# Patient Record
Sex: FEMALE | Race: WHITE | Employment: UNEMPLOYED | URBAN - METROPOLITAN AREA
[De-identification: names, ages, dates, MRNs, and addresses within clinical notes are randomized per-mention and may not be internally consistent; named-entity substitution may affect disease eponyms.]

---

## 2017-01-26 ENCOUNTER — HOSPITAL ENCOUNTER (EMERGENCY)
Age: 32
Discharge: LEFT BEFORE DISCHARGE | End: 2017-01-26
Attending: EMERGENCY MEDICINE
Payer: COMMERCIAL

## 2017-01-26 VITALS
HEIGHT: 66 IN | HEART RATE: 77 BPM | DIASTOLIC BLOOD PRESSURE: 94 MMHG | OXYGEN SATURATION: 100 % | BODY MASS INDEX: 47.09 KG/M2 | SYSTOLIC BLOOD PRESSURE: 141 MMHG | TEMPERATURE: 98.1 F | RESPIRATION RATE: 18 BRPM | WEIGHT: 293 LBS

## 2017-01-26 PROCEDURE — 75810000275 HC EMERGENCY DEPT VISIT NO LEVEL OF CARE

## 2017-08-21 ENCOUNTER — HOSPITAL ENCOUNTER (EMERGENCY)
Age: 32
Discharge: HOME | End: 2017-08-21
Attending: EMERGENCY MEDICINE
Payer: COMMERCIAL

## 2017-08-21 VITALS
WEIGHT: 293 LBS | HEART RATE: 92 BPM | BODY MASS INDEX: 48.82 KG/M2 | OXYGEN SATURATION: 98 % | HEIGHT: 65 IN | SYSTOLIC BLOOD PRESSURE: 127 MMHG | DIASTOLIC BLOOD PRESSURE: 79 MMHG | TEMPERATURE: 98.2 F | RESPIRATION RATE: 12 BRPM

## 2017-08-21 DIAGNOSIS — N30.00 ACUTE CYSTITIS WITHOUT HEMATURIA: ICD-10-CM

## 2017-08-21 DIAGNOSIS — S30.0XXA LUMBAR CONTUSION, INITIAL ENCOUNTER: Primary | ICD-10-CM

## 2017-08-21 LAB
APPEARANCE UR: CLEAR
BACTERIA URNS QL MICRO: ABNORMAL /HPF
BILIRUB UR QL: NEGATIVE
COLOR UR: ABNORMAL
EPITH CASTS URNS QL MICRO: ABNORMAL /LPF
GLUCOSE UR STRIP.AUTO-MCNC: NEGATIVE MG/DL
HCG UR QL: NEGATIVE
HGB UR QL STRIP: NEGATIVE
KETONES UR QL STRIP.AUTO: NEGATIVE MG/DL
LEUKOCYTE ESTERASE UR QL STRIP.AUTO: ABNORMAL
NITRITE UR QL STRIP.AUTO: NEGATIVE
PH UR STRIP: 6.5 [PH] (ref 5–8)
PROT UR STRIP-MCNC: NEGATIVE MG/DL
RBC #/AREA URNS HPF: ABNORMAL /HPF (ref 0–5)
SP GR UR REFRACTOMETRY: 1.02 (ref 1–1.03)
UA: UC IF INDICATED,UAUC: ABNORMAL
UROBILINOGEN UR QL STRIP.AUTO: 0.2 EU/DL (ref 0.2–1)
WBC URNS QL MICRO: ABNORMAL /HPF (ref 0–4)

## 2017-08-21 PROCEDURE — 87086 URINE CULTURE/COLONY COUNT: CPT | Performed by: EMERGENCY MEDICINE

## 2017-08-21 PROCEDURE — 81001 URINALYSIS AUTO W/SCOPE: CPT | Performed by: EMERGENCY MEDICINE

## 2017-08-21 PROCEDURE — 81025 URINE PREGNANCY TEST: CPT

## 2017-08-21 PROCEDURE — 74011250637 HC RX REV CODE- 250/637: Performed by: EMERGENCY MEDICINE

## 2017-08-21 PROCEDURE — 99283 EMERGENCY DEPT VISIT LOW MDM: CPT

## 2017-08-21 RX ORDER — IBUPROFEN 800 MG/1
800 TABLET ORAL
Qty: 20 TAB | Refills: 0 | Status: SHIPPED | OUTPATIENT
Start: 2017-08-21 | End: 2017-08-28

## 2017-08-21 RX ORDER — CIPROFLOXACIN 500 MG/1
500 TABLET ORAL 2 TIMES DAILY
Qty: 10 TAB | Refills: 0 | Status: SHIPPED | OUTPATIENT
Start: 2017-08-21 | End: 2017-08-26

## 2017-08-21 RX ORDER — HYDROCODONE BITARTRATE AND ACETAMINOPHEN 10; 325 MG/1; MG/1
1 TABLET ORAL
Qty: 20 TAB | Refills: 0 | Status: SHIPPED | OUTPATIENT
Start: 2017-08-21 | End: 2020-01-01

## 2017-08-21 RX ORDER — HYDROCODONE BITARTRATE AND ACETAMINOPHEN 5; 325 MG/1; MG/1
1 TABLET ORAL
Status: COMPLETED | OUTPATIENT
Start: 2017-08-21 | End: 2017-08-21

## 2017-08-21 RX ORDER — METHOCARBAMOL 500 MG/1
500 TABLET, FILM COATED ORAL 4 TIMES DAILY
Qty: 30 TAB | Refills: 0 | Status: SHIPPED | OUTPATIENT
Start: 2017-08-21 | End: 2020-01-01

## 2017-08-21 RX ADMIN — HYDROCODONE BITARTRATE AND ACETAMINOPHEN 1 TABLET: 5; 325 TABLET ORAL at 19:47

## 2017-08-21 NOTE — ED NOTES
Patient presented to the ED today for complaints of medial lower back pain. Patient says symptoms started three days ago. Patient rates pain 9/10 and pressure sensation. Respirations are even and unalbreod. Skin is dry,warm, and itnact.

## 2017-08-21 NOTE — DISCHARGE INSTRUCTIONS
Low Back Contusion: Care Instructions  Your Care Instructions  Contusion is the medical term for a bruise. When you have a low back bruise, it's often caused by a direct blow or an impact, such as falling against a counter or table. Bruises are common sports injuries. Most people think of a bruise as a black-and-blue spot. This happens when small blood vessels get torn and leak blood under the skin. But bones, muscles, and organs can also get bruised. If these deep tissues are damaged, you may not always see a bruise. The doctor will examine your bruise. You may also have tests to make sure you do not have a more serious injury, such as a broken bone or nerve damage. Tests may include X-rays or other imaging tests like a CT scan or MRI. Low back bruises may cause pain and swelling. But if there is no serious damage, they will often get better with home treatment in several days to a few weeks. The doctor has checked you carefully, but problems can develop later. If you notice any problems or new symptoms, get medical treatment right away. Follow-up care is a key part of your treatment and safety. Be sure to make and go to all appointments, and call your doctor if you are having problems. It's also a good idea to know your test results and keep a list of the medicines you take. How can you care for yourself at home? · Put ice or a cold pack on the sore area for 10 to 20 minutes at a time to stop swelling. Put a thin cloth between the ice pack and your skin. · Be safe with medicines. Read and follow all instructions on the label. ¨ If the doctor gave you a prescription medicine for pain, take it as prescribed. ¨ If you are not taking a prescription pain medicine, ask your doctor if you can take an over-the-counter medicine. · For the first day or two of pain, take it easy. But as soon as possible, get back to your normal daily life and activities. · Get gentle exercise, such as walking.  Movement keeps your spine flexible and helps your muscles stay strong. When should you call for help? Call 911 anytime you think you may need emergency care. For example, call if:  · You are unable to move a leg at all. Call your doctor now or seek immediate medical care if:  · You have new or worse symptoms in your legs or buttocks. Symptoms may include:  ¨ Numbness or tingling. ¨ Weakness. ¨ Pain. · You lose bladder or bowel control. · You have blood in your urine. Watch closely for changes in your health, and be sure to contact your doctor if:  · You do not get better as expected. Where can you learn more? Go to http://tita-wade.info/. Enter V337 in the search box to learn more about \"Low Back Contusion: Care Instructions. \"  Current as of: March 20, 2017  Content Version: 11.3  © 1655-4515 [email or web address]. Care instructions adapted under license by [employer] (which disclaims liability or warranty for this information). If you have questions about a medical condition or this instruction, always ask your healthcare professional. [place] [address] any warranty or liability for your use of this information. Urinary Tract Infection in Women: Care Instructions  Your Care Instructions    A urinary tract infection, or UTI, is a general term for an infection anywhere between the kidneys and the urethra (where urine comes out). Most UTIs are bladder infections. They often cause pain or burning when you urinate. UTIs are caused by bacteria and can be cured with antibiotics. Be sure to complete your treatment so that the infection goes away. Follow-up care is a key part of your treatment and safety. Be sure to make and go to all appointments, and call your doctor if you are having problems. It's also a good idea to know your test results and keep a list of the medicines you take. How can you care for yourself at home? · Take your antibiotics as directed.  Do not stop taking them just because you feel better. You need to take the full course of antibiotics. · Drink extra water and other fluids for the next day or two. This may help wash out the bacteria that are causing the infection. (If you have kidney, heart, or liver disease and have to limit fluids, talk with your doctor before you increase your fluid intake.)  · Avoid drinks that are carbonated or have caffeine. They can irritate the bladder. · Urinate often. Try to empty your bladder each time. · To relieve pain, take a hot bath or lay a heating pad set on low over your lower belly or genital area. Never go to sleep with a heating pad in place. To prevent UTIs  · Drink plenty of water each day. This helps you urinate often, which clears bacteria from your system. (If you have kidney, heart, or liver disease and have to limit fluids, talk with your doctor before you increase your fluid intake.)  · Urinate when you need to. · Urinate right after you have sex. · Change sanitary pads often. · Avoid douches, bubble baths, feminine hygiene sprays, and other feminine hygiene products that have deodorants. · After going to the bathroom, wipe from front to back. When should you call for help? Call your doctor now or seek immediate medical care if:  · Symptoms such as fever, chills, nausea, or vomiting get worse or appear for the first time. · You have new pain in your back just below your rib cage. This is called flank pain. · There is new blood or pus in your urine. · You have any problems with your antibiotic medicine. Watch closely for changes in your health, and be sure to contact your doctor if:  · You are not getting better after taking an antibiotic for 2 days. · Your symptoms go away but then come back. Where can you learn more? Go to http://tita-wade.info/. Enter Z598 in the search box to learn more about \"Urinary Tract Infection in Women: Care Instructions. \"  Current as of: November 28, 2016  Content Version: 11.3  © 3019-2833 [employer]. Care instructions adapted under license by [employer] (which disclaims liability or warranty for this information). If you have questions about a medical condition or this instruction, always ask your healthcare professional. [address] any warranty or liability for your use of this information.

## 2017-08-21 NOTE — LETTER
[facility] EMERGENCY DEPT 
[address] [address] [phone] Work/School Note Date: 8/21/2017 To Whom It May concern: Nora Zapata was seen and treated today in the emergency room by the following provider(s): 
Attending Provider: Kerrie Perales MD.   
 
Nora Zapata may return to work on August 26th 2017. Sincerely, Trini Faye MD

## 2017-08-21 NOTE — ED PROVIDER NOTES
HPI Comments: [address], 29 y.o. female, with PMHx of depression, back pain presents ambulatory to [facility] ED with cc of \"tight, pressure\" lower back pain x 3 days. Pt states that she slipped and fell in her bathroom 1 week ago, but did not begin experiencing back pain until the past few days. She denies head trauma or LOC. She denies any numbness in her legs. Pt notes that she has a hx of back pain but has not received a specific diagnosis from her doctor. LMP was 3 years ago (patient is on birth control). NKDA. PCP: Savita Sanders NP    Social history significant for: - Tobacco, + EtOH, - Illicit Drug Use    There are no other complaints, changes, or physical findings at this time. The history is provided by the patient. No  was used. Past Medical History:   Diagnosis Date    Depression        Past Surgical History:   Procedure Laterality Date    HX DILATION AND CURETTAGE      HX HEENT      tonsillectomy    HX TONSILLECTOMY           Family History:   Problem Relation Age of Onset    Thyroid Disease Mother     Asthma Father     No Known Problems Sister     No Known Problems Brother        Social History     Social History    Marital status: SINGLE     Spouse name: N/A    Number of children: N/A    Years of education: N/A     Occupational History    Not on file. Social History Main Topics    Smoking status: Never Smoker    Smokeless tobacco: Never Used      Comment: smoked a few months at age 25mths    Alcohol use Yes      Comment: Social    Drug use: No    Sexual activity: Yes     Partners: Male     Birth control/ protection: Pill     Other Topics Concern    Not on file     Social History Narrative         ALLERGIES: Review of patient's allergies indicates no known allergies. Review of Systems   Constitutional: Negative for fever. HENT: Negative for sore throat. Eyes: Negative for photophobia and redness.    Respiratory: Negative for shortness of breath and wheezing. Cardiovascular: Negative for chest pain and leg swelling. Gastrointestinal: Negative for abdominal pain, blood in stool, nausea and vomiting. Genitourinary: Negative for difficulty urinating, dysuria, hematuria, menstrual problem and vaginal bleeding. Musculoskeletal: Positive for back pain. Negative for joint swelling. Neurological: Negative for dizziness, seizures, syncope, speech difficulty, weakness, numbness and headaches. Hematological: Negative for adenopathy. Psychiatric/Behavioral: Negative for agitation, confusion and suicidal ideas. The patient is not nervous/anxious. Patient Vitals for the past 12 hrs:   Temp Pulse Resp BP SpO2   08/21/17 1903 98.2 °F (36.8 °C) 92 12 127/79 98 %     Physical Exam   Constitutional: She is oriented to person, place, and time. She appears well-developed and well-nourished. No distress. HENT:   Head: Normocephalic and atraumatic. Mouth/Throat: Oropharynx is clear and moist. No oropharyngeal exudate. Eyes: Conjunctivae and EOM are normal. Pupils are equal, round, and reactive to light. Left eye exhibits no discharge. Neck: Normal range of motion. Neck supple. No JVD present. Cardiovascular: Normal rate, regular rhythm, normal heart sounds and intact distal pulses. Pulmonary/Chest: Effort normal and breath sounds normal. No respiratory distress. She has no wheezes. Abdominal: Soft. Bowel sounds are normal. She exhibits no distension. There is no tenderness. There is no rebound and no guarding. Musculoskeletal: Normal range of motion. She exhibits no edema or deformity. + Lumbosacral area tenderness   Lymphadenopathy:     She has no cervical adenopathy. Neurological: She is alert and oriented to person, place, and time. She has normal reflexes. No cranial nerve deficit. Skin: Skin is warm and dry. No rash noted. Psychiatric: She has a normal mood and affect.  Her behavior is normal.   Nursing note and vitals reviewed. MDM  Number of Diagnoses or Management Options  Diagnosis management comments: DDx: lumbosacral contusion, lumbosacral strain, lumbosacral radiculopathy, UTI       Amount and/or Complexity of Data Reviewed  Clinical lab tests: ordered and reviewed  Review and summarize past medical records: yes  Independent visualization of images, tracings, or specimens: yes    Patient Progress  Patient progress: stable    ED Course       Procedures      LABORATORY TESTS:  Recent Results (from the past 12 hour(s))   HCG URINE, QL. - POC    Collection Time: 08/21/17  7:19 PM   Result Value Ref Range    Pregnancy test,urine (POC) NEGATIVE  NEG     URINALYSIS W/ REFLEX CULTURE    Collection Time: 08/21/17  7:20 PM   Result Value Ref Range    Color YELLOW/STRAW      Appearance CLEAR CLEAR      Specific gravity 1.025 1.003 - 1.030      pH (UA) 6.5 5.0 - 8.0      Protein NEGATIVE  NEG mg/dL    Glucose NEGATIVE  NEG mg/dL    Ketone NEGATIVE  NEG mg/dL    Bilirubin NEGATIVE  NEG      Blood NEGATIVE  NEG      Urobilinogen 0.2 0.2 - 1.0 EU/dL    Nitrites NEGATIVE  NEG      Leukocyte Esterase MODERATE (A) NEG      WBC PENDING /hpf    RBC PENDING /hpf    Epithelial cells PENDING /lpf    Bacteria PENDING /hpf    UA:UC IF INDICATED PENDING        MEDICATIONS GIVEN:  Medications   HYDROcodone-acetaminophen (NORCO) 5-325 mg per tablet 1 Tab (not administered)       IMPRESSION:  1. Lumbar contusion, initial encounter    2. Acute cystitis without hematuria        PLAN:  1. Current Discharge Medication List      START taking these medications    Details   ciprofloxacin HCl (CIPRO) 500 mg tablet Take 1 Tab by mouth two (2) times a day for 5 days. Qty: 10 Tab, Refills: 0      HYDROcodone-acetaminophen (NORCO)  mg tablet Take 1 Tab by mouth every six (6) hours as needed for Pain. Max Daily Amount: 4 Tabs.   Qty: 20 Tab, Refills: 0      ibuprofen (MOTRIN) 800 mg tablet Take 1 Tab by mouth every six (6) hours as needed for Pain for up to 7 days. Qty: 20 Tab, Refills: 0      methocarbamol (ROBAXIN) 500 mg tablet Take 1 Tab by mouth four (4) times daily. Qty: 30 Tab, Refills: 0           2. Follow-up Information     Follow up With Details Comments Jovani Max NP In 1 week  [address] [address]   Krunal Leonard  [phone]          Return to ED if worse     Discharge Note:  7:43 PM    The pt is ready for discharge. The pt's signs, symptoms, diagnosis, and discharge instructions have been discussed and pt has conveyed their understanding. The pt is to follow up as recommended or return to ER should their symptoms worsen. Plan has been discussed and pt is in agreement. This note is prepared by Lisha Bazan, acting as a Scribe for Clemencia Felix MD.    Clemencia Felix MD: The scribe's documentation has been prepared under my direction and personally reviewed by me in its entirety. I confirm that the notes above accurately reflects all work, treatment, procedures, and medical decision making performed by me.

## 2017-08-21 NOTE — ED NOTES
Patient educated on discharge instructions and four (three electronic and one paper)  prescriptions . Patient verbalized understanding of eduction. Patient given discharge instructions and four prescriptions (three electronic and one paper). Patient ambulated out of ED . No acute distress noted. Emergency Department Nursing Plan of Care       The Nursing Plan of Care is developed from the Nursing assessment and Emergency Department Attending provider initial evaluation. The plan of care may be reviewed in the ED Provider note.     The Plan of Care was developed with the following considerations:   Patient / Family readiness to learn indicated by:verbalized understanding  Persons(s) to be included in education: patient  Barriers to Learning/Limitations:No    Signed     Ailyn May RN    8/21/2017   7:53 PM

## 2017-08-23 LAB
BACTERIA SPEC CULT: NORMAL
CC UR VC: NORMAL
SERVICE CMNT-IMP: NORMAL

## 2020-01-01 ENCOUNTER — HOSPITAL ENCOUNTER (EMERGENCY)
Age: 35
Discharge: HOME | End: 2020-01-01
Attending: EMERGENCY MEDICINE
Payer: COMMERCIAL

## 2020-01-01 VITALS
TEMPERATURE: 98.2 F | HEIGHT: 66 IN | HEART RATE: 87 BPM | RESPIRATION RATE: 26 BRPM | DIASTOLIC BLOOD PRESSURE: 95 MMHG | SYSTOLIC BLOOD PRESSURE: 109 MMHG | BODY MASS INDEX: 47.09 KG/M2 | WEIGHT: 293 LBS | OXYGEN SATURATION: 100 %

## 2020-01-01 DIAGNOSIS — H66.002 ACUTE SUPPURATIVE OTITIS MEDIA OF LEFT EAR WITHOUT SPONTANEOUS RUPTURE OF TYMPANIC MEMBRANE, RECURRENCE NOT SPECIFIED: Primary | ICD-10-CM

## 2020-01-01 PROCEDURE — 99282 EMERGENCY DEPT VISIT SF MDM: CPT

## 2020-01-01 RX ORDER — IBUPROFEN 800 MG/1
800 TABLET ORAL
Qty: 20 TAB | Refills: 0 | Status: SHIPPED | OUTPATIENT
Start: 2020-01-01 | End: 2020-01-01

## 2020-01-01 RX ORDER — PROPRANOLOL HYDROCHLORIDE 20 MG/1
20 TABLET ORAL DAILY
Status: ON HOLD | COMMUNITY
End: 2021-04-12

## 2020-01-01 RX ORDER — AMOXICILLIN 500 MG/1
500 TABLET, FILM COATED ORAL 3 TIMES DAILY
Qty: 30 TAB | Refills: 0 | Status: SHIPPED | OUTPATIENT
Start: 2020-01-01 | End: 2020-01-01

## 2020-01-01 RX ORDER — AMOXICILLIN 500 MG/1
500 TABLET, FILM COATED ORAL 3 TIMES DAILY
Qty: 30 TAB | Refills: 0 | Status: ON HOLD | OUTPATIENT
Start: 2020-01-01 | End: 2021-04-12

## 2020-01-01 RX ORDER — IBUPROFEN 800 MG/1
800 TABLET ORAL
Qty: 20 TAB | Refills: 0 | Status: SHIPPED | OUTPATIENT
Start: 2020-01-01 | End: 2020-01-08

## 2020-01-01 RX ORDER — CETIRIZINE HYDROCHLORIDE, PSEUDOEPHEDRINE HYDROCHLORIDE 5; 120 MG/1; MG/1
1 TABLET, FILM COATED, EXTENDED RELEASE ORAL 2 TIMES DAILY
Qty: 20 TAB | Refills: 0 | Status: SHIPPED | OUTPATIENT
Start: 2020-01-01 | End: 2020-01-01

## 2020-01-01 RX ORDER — CETIRIZINE HYDROCHLORIDE, PSEUDOEPHEDRINE HYDROCHLORIDE 5; 120 MG/1; MG/1
1 TABLET, FILM COATED, EXTENDED RELEASE ORAL 2 TIMES DAILY
Qty: 20 TAB | Refills: 0 | Status: ON HOLD | OUTPATIENT
Start: 2020-01-01 | End: 2021-04-12

## 2020-01-01 NOTE — ED PROVIDER NOTES
EMERGENCY DEPARTMENT HISTORY AND PHYSICAL EXAM      Date: 1/1/2020  Patient Name: Malka Jefferson    History of Presenting Illness     Chief Complaint   Patient presents with    Ear Pain     pt states, \"I think I have a sinus infection. \" reports left ear pain and shortness of breath since Thursday    Shortness of Breath       History Provided By: Patient    HPI: Malka Jefferson, 30 y.o. female presents ambulatory to the ED with cc of almost 1 week of 7 out of 10 constant, aching left ear pain that is not any better with ibuprofen and not associated with fever. She also complains of some sinus pressure and chest congestion with a slight cough. She denies any history of asthma. 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 Though she endorses the chest congestion, she denies chest pain or shortness of breath, per se. There are no other complaints, changes, or physical findings at this time. PCP: Deshaun Knox NP    Current Outpatient Medications   Medication Sig Dispense Refill    propranolol (INDERAL) 20 mg tablet Take 20 mg by mouth daily. Indications: anxiety      amoxicillin 500 mg tab Take 500 mg by mouth three (3) times daily. 30 Tab 0    cetirizine-pseudoePHEDrine (ZYRTEC-D) 5-120 mg Tb12 Take 1 Tab by mouth two (2) times a day. 20 Tab 0    ibuprofen (MOTRIN) 800 mg tablet Take 1 Tab by mouth every six (6) hours as needed for Pain for up to 7 days. 20 Tab 0    OTHER Indications: Patient says she takes pill form of birth control.        Past History     Past Medical History:  Past Medical History:   Diagnosis Date    Psychiatric disorder     depression    Psychiatric disorder     anxiety       Past Surgical History:  Past Surgical History:   Procedure Laterality Date    HX DILATION AND CURETTAGE      HX HEENT      tonsillectomy    HX TONSILLECTOMY         Family History:  Family History   Problem Relation Age of Onset    Thyroid Disease Mother     Asthma Father     No Known Problems Sister     No Known Problems Brother        Social History:  Social History     Tobacco Use    Smoking status: Never Smoker    Smokeless tobacco: Never Used    Tobacco comment: smoked a few months at age 25mths   Substance Use Topics    Alcohol use: Yes     Comment: Social    Drug use: No       Allergies:  No Known Allergies  Review of Systems   Review of Systems   Constitutional: Negative for fatigue and fever. HENT: Positive for congestion, ear pain and sinus pressure. Negative for sore throat. Eyes: Negative for pain, redness and visual disturbance. Respiratory: Positive for cough. Negative for shortness of breath. Cardiovascular: Negative for chest pain and palpitations. Gastrointestinal: Negative for abdominal pain, nausea and vomiting. Genitourinary: Negative for dysuria, frequency and urgency. Musculoskeletal: Negative for back pain, gait problem, neck pain and neck stiffness. Skin: Negative for rash and wound. Neurological: Negative for dizziness, weakness, light-headedness, numbness and headaches. Physical Exam   Physical Exam  Vitals signs and nursing note reviewed. Constitutional:       General: She is not in acute distress. Appearance: She is well-developed. She is not toxic-appearing. HENT:      Head: Normocephalic and atraumatic. Jaw: No trismus. Right Ear: Ear canal and external ear normal. Tympanic membrane is scarred. Tympanic membrane is not erythematous or bulging. Left Ear: Ear canal and external ear normal. Tympanic membrane is scarred, erythematous and bulging. Nose: Nose normal.      Mouth/Throat:      Pharynx: Uvula midline. Eyes:      General: No scleral icterus. Conjunctiva/sclera: Conjunctivae normal.      Pupils: Pupils are equal, round, and reactive to light. Neck:      Musculoskeletal: Full passive range of motion without pain and normal range of motion. Cardiovascular:      Rate and Rhythm: Normal rate and regular rhythm. Pulmonary:      Effort: Pulmonary effort is normal. No tachypnea, accessory muscle usage or respiratory distress. Breath sounds: No decreased breath sounds or wheezing. Comments: Breathing is unlabored and lungs are clear to auscultation  Abdominal:      Palpations: Abdomen is soft. Tenderness: There is no tenderness. Musculoskeletal: Normal range of motion. Skin:     Findings: No rash. Neurological:      Mental Status: She is alert and oriented to person, place, and time. She is not disoriented. GCS: GCS eye subscore is 4. GCS verbal subscore is 5. GCS motor subscore is 6. Cranial Nerves: No cranial nerve deficit. Psychiatric:         Speech: Speech normal.       Diagnostic Study Results     Labs -   No results found for this or any previous visit (from the past 12 hour(s)). Radiologic Studies -   No orders to display     CT Results  (Last 48 hours)    None        CXR Results  (Last 48 hours)    None        Medical Decision Making   I am the first provider for this patient. I reviewed the vital signs, available nursing notes, past medical history, past surgical history, family history and social history. Vital Signs-Reviewed the patient's vital signs. Patient Vitals for the past 12 hrs:   Temp Pulse Resp BP SpO2   01/01/20 1659 98.2 °F (36.8 °C) 87 26 (!) 109/95 100 %       Pulse Oximetry Analysis - 100% on RA    Records Reviewed: Nursing Notes, Old Medical Records, Previous Radiology Studies and Previous Laboratory Studies    Provider Notes (Medical Decision Making):   DDx: AOM, viral URI, history of frequent ear infections    ED Course:   Initial assessment performed. The patients presenting problems have been discussed, and they are in agreement with the care plan formulated and outlined with them.   I have encouraged them to ask questions as they arise throughout their visit. Disposition:  Discharge    PLAN:  1. Current Discharge Medication List      START taking these medications    Details   amoxicillin 500 mg tab Take 500 mg by mouth three (3) times daily. Qty: 30 Tab, Refills: 0      cetirizine-pseudoePHEDrine (ZYRTEC-D) 5-120 mg Tb12 Take 1 Tab by mouth two (2) times a day. Qty: 20 Tab, Refills: 0      ibuprofen (MOTRIN) 800 mg tablet Take 1 Tab by mouth every six (6) hours as needed for Pain for up to 7 days. Qty: 20 Tab, Refills: 0           2. Follow-up Information     Follow up With Specialties Details Why Contact Info    Akira Dior NP Nurse Practitioner Schedule an appointment as soon as possible for a visit ENT: as needed if symptoms persist or recur [address]  [phone]          Return to ED if worse     Diagnosis     Clinical Impression:   1.  Acute suppurative otitis media of left ear without spontaneous rupture of tympanic membrane, recurrence not specified

## 2020-01-01 NOTE — ED NOTES
Assumed care of patient. She comes from home with c/o a \"sinus infection since Wednesday. \" She states she can't hear out of her left ear and states her \"breathing feels like it does when I have had bronchitis. \" Pt presents in NAD, but does appear to have BYA. Call bell placed within reached. Updated on plan of care pending evaluation by provider.

## 2020-01-01 NOTE — LETTER
[facility] EMERGENCY DEPT 
[address] [facility] [facility] [address] 
[phone] Work/School Note Date: 1/1/2020 To Whom It May concern: Isabel Myers was seen and treated today in the emergency room by the following provider(s): 
Attending Provider: Jin Forbes DO Physician Assistant: BLAYNE Moreno. Salty Iraheta may return to work on [id number]. Sincerely, BLAYNE Fox

## 2021-01-31 ENCOUNTER — APPOINTMENT (OUTPATIENT)
Dept: GENERAL RADIOLOGY | Age: 36
End: 2021-01-31
Attending: EMERGENCY MEDICINE

## 2021-01-31 ENCOUNTER — HOSPITAL ENCOUNTER (EMERGENCY)
Age: 36
Discharge: HOME | End: 2021-01-31
Attending: EMERGENCY MEDICINE | Admitting: PHYSICIAN ASSISTANT

## 2021-01-31 VITALS
OXYGEN SATURATION: 98 % | SYSTOLIC BLOOD PRESSURE: 147 MMHG | WEIGHT: 293 LBS | HEIGHT: 66 IN | TEMPERATURE: 98.1 F | BODY MASS INDEX: 47.09 KG/M2 | DIASTOLIC BLOOD PRESSURE: 85 MMHG | RESPIRATION RATE: 17 BRPM | HEART RATE: 72 BPM

## 2021-01-31 DIAGNOSIS — R51.9 ACUTE INTRACTABLE HEADACHE, UNSPECIFIED HEADACHE TYPE: ICD-10-CM

## 2021-01-31 DIAGNOSIS — R05.9 COUGH: ICD-10-CM

## 2021-01-31 DIAGNOSIS — R52 GENERALIZED BODY ACHES: Primary | ICD-10-CM

## 2021-01-31 DIAGNOSIS — Z20.822 SUSPECTED COVID-19 VIRUS INFECTION: ICD-10-CM

## 2021-01-31 LAB
ALBUMIN SERPL-MCNC: 3 G/DL (ref 3.5–5)
ALBUMIN/GLOB SERPL: 0.7 {RATIO} (ref 1.1–2.2)
ALP SERPL-CCNC: 79 U/L (ref 45–117)
ALT SERPL-CCNC: 25 U/L (ref 12–78)
ANION GAP SERPL CALC-SCNC: 5 MMOL/L (ref 5–15)
APPEARANCE UR: CLEAR
AST SERPL-CCNC: 15 U/L (ref 15–37)
BACTERIA URNS QL MICRO: NEGATIVE /HPF
BASOPHILS # BLD: 0 K/UL (ref 0–0.1)
BASOPHILS NFR BLD: 0 % (ref 0–1)
BILIRUB SERPL-MCNC: 0.3 MG/DL (ref 0.2–1)
BILIRUB UR QL: NEGATIVE
BUN SERPL-MCNC: 13 MG/DL (ref 6–20)
BUN/CREAT SERPL: 15 (ref 12–20)
CALCIUM SERPL-MCNC: 8.9 MG/DL (ref 8.5–10.1)
CHLORIDE SERPL-SCNC: 109 MMOL/L (ref 97–108)
CO2 SERPL-SCNC: 24 MMOL/L (ref 21–32)
COLOR UR: ABNORMAL
CREAT SERPL-MCNC: 0.84 MG/DL (ref 0.55–1.02)
DEPRECATED S PYO AG THROAT QL EIA: NEGATIVE
DIFFERENTIAL METHOD BLD: NORMAL
EOSINOPHIL # BLD: 0.2 K/UL (ref 0–0.4)
EOSINOPHIL NFR BLD: 3 % (ref 0–7)
EPITH CASTS URNS QL MICRO: ABNORMAL /LPF
ERYTHROCYTE [DISTWIDTH] IN BLOOD BY AUTOMATED COUNT: 13 % (ref 11.5–14.5)
FLUAV AG NPH QL IA: NEGATIVE
FLUBV AG NOSE QL IA: NEGATIVE
GLOBULIN SER CALC-MCNC: 4.2 G/DL (ref 2–4)
GLUCOSE SERPL-MCNC: 80 MG/DL (ref 65–100)
GLUCOSE UR STRIP.AUTO-MCNC: NEGATIVE MG/DL
HCG UR QL: NEGATIVE
HCT VFR BLD AUTO: 41.9 % (ref 35–47)
HGB BLD-MCNC: 13.7 G/DL (ref 11.5–16)
HGB UR QL STRIP: NEGATIVE
HYALINE CASTS URNS QL MICRO: ABNORMAL /LPF (ref 0–5)
IMM GRANULOCYTES # BLD AUTO: 0 K/UL (ref 0–0.04)
IMM GRANULOCYTES NFR BLD AUTO: 0 % (ref 0–0.5)
KETONES UR QL STRIP.AUTO: NEGATIVE MG/DL
LEUKOCYTE ESTERASE UR QL STRIP.AUTO: ABNORMAL
LYMPHOCYTES # BLD: 2.5 K/UL (ref 0.8–3.5)
LYMPHOCYTES NFR BLD: 32 % (ref 12–49)
MCH RBC QN AUTO: 29.3 PG (ref 26–34)
MCHC RBC AUTO-ENTMCNC: 32.7 G/DL (ref 30–36.5)
MCV RBC AUTO: 89.7 FL (ref 80–99)
MONOCYTES # BLD: 0.6 K/UL (ref 0–1)
MONOCYTES NFR BLD: 7 % (ref 5–13)
NEUTS SEG # BLD: 4.5 K/UL (ref 1.8–8)
NEUTS SEG NFR BLD: 58 % (ref 32–75)
NITRITE UR QL STRIP.AUTO: NEGATIVE
NRBC # BLD: 0 K/UL (ref 0–0.01)
NRBC BLD-RTO: 0 PER 100 WBC
PH UR STRIP: 6 [PH] (ref 5–8)
PLATELET # BLD AUTO: 309 K/UL (ref 150–400)
PMV BLD AUTO: 10.5 FL (ref 8.9–12.9)
POTASSIUM SERPL-SCNC: 4.4 MMOL/L (ref 3.5–5.1)
PROT SERPL-MCNC: 7.2 G/DL (ref 6.4–8.2)
PROT UR STRIP-MCNC: NEGATIVE MG/DL
RBC # BLD AUTO: 4.67 M/UL (ref 3.8–5.2)
RBC #/AREA URNS HPF: ABNORMAL /HPF (ref 0–5)
SARS-COV-2, COV2: NORMAL
SODIUM SERPL-SCNC: 138 MMOL/L (ref 136–145)
SP GR UR REFRACTOMETRY: 1.02 (ref 1–1.03)
TROPONIN I SERPL-MCNC: <0.05 NG/ML
UA: UC IF INDICATED,UAUC: ABNORMAL
UROBILINOGEN UR QL STRIP.AUTO: 0.2 EU/DL (ref 0.2–1)
WBC # BLD AUTO: 7.8 K/UL (ref 3.6–11)
WBC URNS QL MICRO: ABNORMAL /HPF (ref 0–4)

## 2021-01-31 PROCEDURE — 93005 ELECTROCARDIOGRAM TRACING: CPT

## 2021-01-31 PROCEDURE — U0003 INFECTIOUS AGENT DETECTION BY NUCLEIC ACID (DNA OR RNA); SEVERE ACUTE RESPIRATORY SYNDROME CORONAVIRUS 2 (SARS-COV-2) (CORONAVIRUS DISEASE [COVID-19]), AMPLIFIED PROBE TECHNIQUE, MAKING USE OF HIGH THROUGHPUT TECHNOLOGIES AS DESCRIBED BY CMS-2020-01-R: HCPCS

## 2021-01-31 PROCEDURE — 81001 URINALYSIS AUTO W/SCOPE: CPT

## 2021-01-31 PROCEDURE — 84484 ASSAY OF TROPONIN QUANT: CPT

## 2021-01-31 PROCEDURE — 87880 STREP A ASSAY W/OPTIC: CPT

## 2021-01-31 PROCEDURE — 71045 X-RAY EXAM CHEST 1 VIEW: CPT

## 2021-01-31 PROCEDURE — 99284 EMERGENCY DEPT VISIT MOD MDM: CPT

## 2021-01-31 PROCEDURE — 80053 COMPREHEN METABOLIC PANEL: CPT

## 2021-01-31 PROCEDURE — 85025 COMPLETE CBC W/AUTO DIFF WBC: CPT

## 2021-01-31 PROCEDURE — 87070 CULTURE OTHR SPECIMN AEROBIC: CPT

## 2021-01-31 PROCEDURE — 87804 INFLUENZA ASSAY W/OPTIC: CPT

## 2021-01-31 PROCEDURE — 81025 URINE PREGNANCY TEST: CPT

## 2021-01-31 PROCEDURE — 36415 COLL VENOUS BLD VENIPUNCTURE: CPT

## 2021-01-31 PROCEDURE — U0005 INFEC AGEN DETEC AMPLI PROBE: HCPCS

## 2021-01-31 RX ORDER — BENZONATATE 100 MG/1
100 CAPSULE ORAL
Qty: 20 CAP | Refills: 0 | Status: ON HOLD | OUTPATIENT
Start: 2021-01-31 | End: 2021-04-12

## 2021-01-31 RX ORDER — CEFDINIR 300 MG/1
300 CAPSULE ORAL 2 TIMES DAILY
Qty: 14 CAP | Refills: 0 | Status: SHIPPED | OUTPATIENT
Start: 2021-01-31 | End: 2021-02-07

## 2021-01-31 RX ORDER — ONDANSETRON 4 MG/1
4 TABLET, ORALLY DISINTEGRATING ORAL
Qty: 10 TAB | Refills: 0 | Status: ON HOLD | OUTPATIENT
Start: 2021-01-31 | End: 2021-04-12

## 2021-01-31 NOTE — ED PROVIDER NOTES
EMERGENCY DEPARTMENT HISTORY AND PHYSICAL EXAM      Date: 1/31/2021  Patient Name: Stone Peraza    History of Presenting Illness     Chief Complaint   Patient presents with    Concern For COVID-19 (Coronavirus)     Pt having a headache, congestion, sore throat, and nausea. Pt states she worked with someone that was positive for COVID on the 15th and was tested on the 19th and 26th and both tests were negative. Pt states she has been having symptoms since the 23rd but states that her symptoms just continue to get worse.  Shortness of Breath     SOB x 1 month. Denies CP. History Provided By: Patient    HPI: Stone Peraza, 37 y.o. female presents to the Emergency Dept with c/o shortness of breath, fatigue and malaise x 1 month. She reports she has worked along side someone who was positive for COVID and was tested on 1/19/21 and 1/26/21 and both were negative. Since that time, she has had worsening symptoms of congestion, sore throat, nausea, headache and body aches. She denied any chronic illnesses. Denied concern for pregnancy. No dysuria/hematuria. No documented fever/chils. No rash/lesion. Chief Complaint: shortness of breath, congestion, sore throat, nausea, headache  Duration: 1 Months  Timing:  Acute  Location: diffuse  Quality: Aching  Severity: Moderate  Modifying Factors: in spite of multiple prior tests for COVID is concerned for same, +exposure  Associated Symptoms: denies any other associated signs or symptoms        There are no other complaints, changes, or physical findings at this time. PCP: None    Current Outpatient Medications   Medication Sig Dispense Refill    propranolol (INDERAL) 20 mg tablet Take 20 mg by mouth daily. Indications: anxiety      amoxicillin 500 mg tab Take 500 mg by mouth three (3) times daily. 30 Tab 0    cetirizine-pseudoePHEDrine (ZYRTEC-D) 5-120 mg Tb12 Take 1 Tab by mouth two (2) times a day.  20 Tab 0    OTHER Indications: Patient says she takes pill form of birth control. Past History     Past Medical History:  Past Medical History:   Diagnosis Date    Psychiatric disorder     depression    Psychiatric disorder     anxiety       Past Surgical History:  Past Surgical History:   Procedure Laterality Date    HX DILATION AND CURETTAGE      HX HEENT      tonsillectomy    HX TONSILLECTOMY         Family History:  Family History   Problem Relation Age of Onset    Thyroid Disease Mother     Asthma Father     No Known Problems Sister     No Known Problems Brother        Social History:  Social History     Tobacco Use    Smoking status: Never Smoker    Smokeless tobacco: Never Used    Tobacco comment: smoked a few months at age 25mths   Substance Use Topics    Alcohol use: Yes     Comment: Social    Drug use: No       Allergies:  No Known Allergies      Review of Systems   Review of Systems   Constitutional: Negative for chills and fever. HENT: Positive for congestion, rhinorrhea and sore throat. Respiratory: Positive for cough and shortness of breath. Cardiovascular: Negative for chest pain and palpitations. Gastrointestinal: Positive for nausea. Negative for abdominal pain, diarrhea and vomiting. Endocrine: Negative for polydipsia, polyphagia and polyuria. Genitourinary: Negative for dysuria and hematuria. Musculoskeletal: Negative for neck pain and neck stiffness. Skin: Negative for rash and wound. Allergic/Immunologic: Negative for food allergies and immunocompromised state. Neurological: Positive for headaches. Negative for dizziness and weakness. Hematological: Negative for adenopathy. Does not bruise/bleed easily. Psychiatric/Behavioral: Negative for agitation and confusion. All other systems reviewed and are negative. Physical Exam   Physical Exam  Vitals signs and nursing note reviewed. Constitutional:       General: She is not in acute distress. Appearance: She is well-developed and normal weight.  She is ill-appearing. She is not toxic-appearing or diaphoretic. HENT:      Head: Normocephalic and atraumatic. Nose: Nose normal.      Mouth/Throat:      Mouth: Mucous membranes are moist.      Pharynx: No pharyngeal swelling or oropharyngeal exudate. Eyes:      General: No scleral icterus. Right eye: No discharge. Left eye: No discharge. Extraocular Movements: Extraocular movements intact. Conjunctiva/sclera: Conjunctivae normal.      Pupils: Pupils are equal, round, and reactive to light. Neck:      Musculoskeletal: Normal range of motion and neck supple. Thyroid: No thyromegaly. Vascular: No JVD. Trachea: No tracheal deviation. Cardiovascular:      Rate and Rhythm: Normal rate and regular rhythm. Heart sounds: Normal heart sounds. Pulmonary:      Effort: Pulmonary effort is normal. No respiratory distress. Breath sounds: Normal breath sounds. No wheezing. Chest:      Chest wall: No tenderness. Abdominal:      Palpations: Abdomen is soft. Tenderness: There is no abdominal tenderness. Musculoskeletal: Normal range of motion. Right lower leg: No edema. Left lower leg: No edema. Lymphadenopathy:      Cervical: No cervical adenopathy. Skin:     General: Skin is warm and dry. Coloration: Skin is not pale. Findings: No rash. Neurological:      General: No focal deficit present. Mental Status: She is alert and oriented to person, place, and time. Motor: No abnormal muscle tone. Coordination: Coordination normal.   Psychiatric:         Mood and Affect: Mood normal.         Behavior: Behavior normal.         Judgment: Judgment normal.         Diagnostic Study Results     Labs -   No results found for this or any previous visit (from the past 12 hour(s)). Radiologic Studies -   XR CHEST PORT   Final Result   Impression: No acute process.                Medical Decision Making   I am the first provider for this patient. I reviewed the vital signs, available nursing notes, past medical history, past surgical history, family history and social history. Vital Signs-Reviewed the patient's vital signs. Patient Vitals for the past 12 hrs:   Temp Pulse Resp BP SpO2   01/31/21 1612 98.1 °F (36.7 °C) 72 17 (!) 147/85 100 %         Records Reviewed: Nursing Notes, Old Medical Records, Previous Radiology Studies and Previous Laboratory Studies    Provider Notes (Medical Decision Making):   Strep, influenza, URI, PNA, arrhythmia, electrolyte dysfunction, COVID    ED Course:   Initial assessment performed. The patients presenting problems have been discussed, and they are in agreement with the care plan formulated and outlined with them. I have encouraged them to ask questions as they arise throughout their visit. The evaluation, management, and disposition decisions of this patient have been made in the context of the current and rapidly developing COVID-19 pandemic. In my clinical judgment, the balance of clinical factors dictate expedited evaluation and discharge from the ED. I have carefully considered the risk and benefits of prolonged ED workups and/or hospitalization vs their risk of acquiring or transmitting COVID-19. I have made reasonable efforts to conserve healthcare resources and defer to safe outpatient alternatives when feasible. I have also discussed the importance of social distancing and proper hygiene to the patient. Based on an appropriate medical screening exam, there is currently no evidence of an emergency medical condition in the patient, and she is clinically safe for discharge. This was a collective decision made with the patient and/or any available family/caretakers. They expressed understanding and agreement with the above. Ariela Claros       PLAN:  1.    Current Discharge Medication List      START taking these medications    Details   cefdinir (OMNICEF) 300 mg capsule Take 1 Cap by mouth two (2) times a day for 7 days. Qty: 14 Cap, Refills: 0      benzonatate (Tessalon Perles) 100 mg capsule Take 1 Cap by mouth three (3) times daily as needed for Cough for up to 20 doses. Qty: 20 Cap, Refills: 0      ondansetron (Zofran ODT) 4 mg disintegrating tablet Take 1 Tab by mouth every eight (8) hours as needed for Nausea or Vomiting for up to 10 doses. Qty: 10 Tab, Refills: 0           2. Follow-up Information     Follow up With Specialties Details Why Contact Info    Karishma Ivy MD Internal Medicine   [id number] [facility]  [phone]      [facility] EMERGENCY DEPT Emergency Medicine  If symptoms worsen [address] [place] [address] Drive  [address] [facility]  [phone]        Return to ED if worse     Diagnosis     Clinical Impression:   1. Generalized body aches    2. Acute intractable headache, unspecified headache type    3. Cough    4.  Suspected COVID-19 virus infection

## 2021-01-31 NOTE — DISCHARGE INSTRUCTIONS
Rest, push fluids, Tylenol for fever, and follow up with PCP for recheck. Return to the emergency department for any worsening fever, cough, chest pain, shortness of breath, decreased oral intake, or decreased urine output.

## 2021-02-01 ENCOUNTER — PATIENT OUTREACH (OUTPATIENT)
Dept: CASE MANAGEMENT | Age: 36
End: 2021-02-01

## 2021-02-01 LAB
ATRIAL RATE: 72 BPM
CALCULATED P AXIS, ECG09: 48 DEGREES
CALCULATED R AXIS, ECG10: -16 DEGREES
CALCULATED T AXIS, ECG11: 16 DEGREES
DIAGNOSIS, 93000: NORMAL
P-R INTERVAL, ECG05: 170 MS
Q-T INTERVAL, ECG07: 376 MS
QRS DURATION, ECG06: 88 MS
QTC CALCULATION (BEZET), ECG08: 411 MS
SARS-COV-2, XPLCVT: NOT DETECTED
SOURCE, COVRS: NORMAL
VENTRICULAR RATE, ECG03: 72 BPM

## 2021-02-01 NOTE — PROGRESS NOTES
Patient contacted regarding COVID-19 exposure. Discussed COVID-19 related testing which was available at this time. Test results were pending. Patient informed of results, if available? no     Care Transition Nurse/ Ambulatory Care Manager contacted the patient by telephone to perform post discharge assessment. Call within 2 business days of discharge: Yes Verified name and  with patient as identifiers. Provided introduction to self, and explanation of the CTN/ACM role, and reason for call due to risk factors for infection and/or exposure to COVID-19. Symptoms reviewed with patient who verbalized the following symptoms: no worsening symptoms      Due to no new or worsening symptoms encounter was not routed to provider for escalation. Discussed follow-up appointments. If no appointment was previously scheduled, appointment scheduling offered:  [facility] follow up appointment(s): No future appointments. Non-[facility] follow up appointment(s): n/a     Advance Care Planning:   Does patient have an Advance Directive:  patient declined education. Patient has following risk factors of: no known risk factors. CTN/ACM reviewed discharge instructions, medical action plan and red flags such as increased shortness of breath, increasing fever and signs of decompensation with patient who verbalized understanding. Discussed exposure protocols and quarantine with CDC Guidelines What to do if you are sick with coronavirus disease 2019.  Patient was given an opportunity for questions and concerns. The patient agrees to contact the [facility] exposure line [phone], [facility]  ([phone] and PCP office for questions related to their healthcare. CTN/ACM provided contact information for future needs.     Reviewed and educated patient on any new and changed medications related to discharge diagnosis     Patient/family/caregiver given information for Damian Gonzales and agrees to enroll no  Patient's preferred e-mail: n/a   Patient's preferred phone number: n/a  Based on Loop alert triggers, patient will be contacted by nurse care manager for worsening symptoms. Plan for follow-up call in 7-14 days based on severity of symptoms and risk factors.

## 2021-02-02 LAB
BACTERIA SPEC CULT: NORMAL
SERVICE CMNT-IMP: NORMAL

## 2021-02-16 ENCOUNTER — PATIENT OUTREACH (OUTPATIENT)
Dept: CASE MANAGEMENT | Age: 36
End: 2021-02-16

## 2021-02-16 NOTE — PROGRESS NOTES
Patient resolved from Transition of Care episode on 02/16/21. ACM/CTN was unsuccessful at contacting this patient today. Patient/family was provided the following resources and education related to COVID-19 during the initial call:                         Signs, symptoms and red flags related to COVID-19            CDC exposure and quarantine guidelines            [facility] exposure contact - [phone]            Contact for their local Department of Health                 Patient has not had any additional ED or hospital visits. No further outreach scheduled with this CTN/ACM. Episode of Care resolved. Patient has this CTN/ACM contact information if future needs arise.

## 2021-04-11 ENCOUNTER — HOSPITAL ENCOUNTER (INPATIENT)
Age: 36
LOS: 2 days | Discharge: HOME | DRG: 885 | End: 2021-04-13
Attending: EMERGENCY MEDICINE | Admitting: PSYCHIATRY & NEUROLOGY

## 2021-04-11 DIAGNOSIS — T42.4X2A INTENTIONAL BENZODIAZEPINE OVERDOSE, INITIAL ENCOUNTER (HCC): Primary | ICD-10-CM

## 2021-04-11 PROBLEM — F32.9 MAJOR DEPRESSION: Status: ACTIVE | Noted: 2021-04-11

## 2021-04-11 LAB
ALBUMIN SERPL-MCNC: 3.2 G/DL (ref 3.5–5)
ALBUMIN/GLOB SERPL: 0.8 {RATIO} (ref 1.1–2.2)
ALP SERPL-CCNC: 88 U/L (ref 45–117)
ALT SERPL-CCNC: 30 U/L (ref 12–78)
AMPHET UR QL SCN: NEGATIVE
ANION GAP SERPL CALC-SCNC: 3 MMOL/L (ref 5–15)
APAP SERPL-MCNC: <2 UG/ML (ref 10–30)
AST SERPL-CCNC: 15 U/L (ref 15–37)
ATRIAL RATE: 79 BPM
BARBITURATES UR QL SCN: NEGATIVE
BASOPHILS # BLD: 0 K/UL (ref 0–0.1)
BASOPHILS NFR BLD: 1 % (ref 0–1)
BENZODIAZ UR QL: POSITIVE
BILIRUB SERPL-MCNC: 0.4 MG/DL (ref 0.2–1)
BUN SERPL-MCNC: 15 MG/DL (ref 6–20)
BUN/CREAT SERPL: 22 (ref 12–20)
CALCIUM SERPL-MCNC: 8.7 MG/DL (ref 8.5–10.1)
CALCULATED P AXIS, ECG09: 36 DEGREES
CALCULATED R AXIS, ECG10: -8 DEGREES
CALCULATED T AXIS, ECG11: 13 DEGREES
CANNABINOIDS UR QL SCN: NEGATIVE
CHLORIDE SERPL-SCNC: 109 MMOL/L (ref 97–108)
CO2 SERPL-SCNC: 28 MMOL/L (ref 21–32)
COCAINE UR QL SCN: NEGATIVE
CREAT SERPL-MCNC: 0.68 MG/DL (ref 0.55–1.02)
DIAGNOSIS, 93000: NORMAL
DIFFERENTIAL METHOD BLD: NORMAL
DRUG SCRN COMMENT,DRGCM: ABNORMAL
EOSINOPHIL # BLD: 0.2 K/UL (ref 0–0.4)
EOSINOPHIL NFR BLD: 3 % (ref 0–7)
ERYTHROCYTE [DISTWIDTH] IN BLOOD BY AUTOMATED COUNT: 13.1 % (ref 11.5–14.5)
ETHANOL SERPL-MCNC: <10 MG/DL
FLUAV RNA SPEC QL NAA+PROBE: NOT DETECTED
FLUBV RNA SPEC QL NAA+PROBE: NOT DETECTED
GLOBULIN SER CALC-MCNC: 3.8 G/DL (ref 2–4)
GLUCOSE SERPL-MCNC: 91 MG/DL (ref 65–100)
HCG UR QL: NEGATIVE
HCT VFR BLD AUTO: 42.2 % (ref 35–47)
HGB BLD-MCNC: 13.5 G/DL (ref 11.5–16)
IMM GRANULOCYTES # BLD AUTO: 0 K/UL (ref 0–0.04)
IMM GRANULOCYTES NFR BLD AUTO: 0 % (ref 0–0.5)
LYMPHOCYTES # BLD: 1.4 K/UL (ref 0.8–3.5)
LYMPHOCYTES NFR BLD: 22 % (ref 12–49)
MAGNESIUM SERPL-MCNC: 2.1 MG/DL (ref 1.6–2.4)
MCH RBC QN AUTO: 28.8 PG (ref 26–34)
MCHC RBC AUTO-ENTMCNC: 32 G/DL (ref 30–36.5)
MCV RBC AUTO: 90.2 FL (ref 80–99)
METHADONE UR QL: NEGATIVE
MONOCYTES # BLD: 0.4 K/UL (ref 0–1)
MONOCYTES NFR BLD: 6 % (ref 5–13)
NEUTS SEG # BLD: 4.4 K/UL (ref 1.8–8)
NEUTS SEG NFR BLD: 68 % (ref 32–75)
NRBC # BLD: 0 K/UL (ref 0–0.01)
NRBC BLD-RTO: 0 PER 100 WBC
OPIATES UR QL: NEGATIVE
P-R INTERVAL, ECG05: 188 MS
PCP UR QL: NEGATIVE
PLATELET # BLD AUTO: 269 K/UL (ref 150–400)
PMV BLD AUTO: 9.8 FL (ref 8.9–12.9)
POTASSIUM SERPL-SCNC: 4 MMOL/L (ref 3.5–5.1)
PROT SERPL-MCNC: 7 G/DL (ref 6.4–8.2)
Q-T INTERVAL, ECG07: 368 MS
QRS DURATION, ECG06: 80 MS
QTC CALCULATION (BEZET), ECG08: 421 MS
RBC # BLD AUTO: 4.68 M/UL (ref 3.8–5.2)
SALICYLATES SERPL-MCNC: <1.7 MG/DL (ref 2.8–20)
SARS-COV-2, COV2: NOT DETECTED
SODIUM SERPL-SCNC: 140 MMOL/L (ref 136–145)
TSH SERPL DL<=0.05 MIU/L-ACNC: 1.47 UIU/ML (ref 0.36–3.74)
UR CULT HOLD, URHOLD: NORMAL
VENTRICULAR RATE, ECG03: 79 BPM
WBC # BLD AUTO: 6.4 K/UL (ref 3.6–11)

## 2021-04-11 PROCEDURE — 80307 DRUG TEST PRSMV CHEM ANLYZR: CPT

## 2021-04-11 PROCEDURE — 36415 COLL VENOUS BLD VENIPUNCTURE: CPT

## 2021-04-11 PROCEDURE — 80143 DRUG ASSAY ACETAMINOPHEN: CPT

## 2021-04-11 PROCEDURE — 65220000003 HC RM SEMIPRIVATE PSYCH

## 2021-04-11 PROCEDURE — 90791 PSYCH DIAGNOSTIC EVALUATION: CPT

## 2021-04-11 PROCEDURE — 81025 URINE PREGNANCY TEST: CPT

## 2021-04-11 PROCEDURE — 85025 COMPLETE CBC W/AUTO DIFF WBC: CPT

## 2021-04-11 PROCEDURE — 87636 SARSCOV2 & INF A&B AMP PRB: CPT

## 2021-04-11 PROCEDURE — 82077 ASSAY SPEC XCP UR&BREATH IA: CPT

## 2021-04-11 PROCEDURE — 84443 ASSAY THYROID STIM HORMONE: CPT

## 2021-04-11 PROCEDURE — 80179 DRUG ASSAY SALICYLATE: CPT

## 2021-04-11 PROCEDURE — 83735 ASSAY OF MAGNESIUM: CPT

## 2021-04-11 PROCEDURE — 93005 ELECTROCARDIOGRAM TRACING: CPT

## 2021-04-11 PROCEDURE — 99285 EMERGENCY DEPT VISIT HI MDM: CPT

## 2021-04-11 PROCEDURE — 80053 COMPREHEN METABOLIC PANEL: CPT

## 2021-04-11 PROCEDURE — 74011250637 HC RX REV CODE- 250/637: Performed by: EMERGENCY MEDICINE

## 2021-04-11 RX ORDER — PHENOBARBITAL 32.4 MG/1
32.4 TABLET ORAL
Status: DISCONTINUED | OUTPATIENT
Start: 2021-04-11 | End: 2021-04-13 | Stop reason: HOSPADM

## 2021-04-11 RX ORDER — HYDROXYZINE 50 MG/1
50 TABLET, FILM COATED ORAL
Status: DISCONTINUED | OUTPATIENT
Start: 2021-04-11 | End: 2021-04-13 | Stop reason: HOSPADM

## 2021-04-11 RX ORDER — OLANZAPINE 2.5 MG/1
2.5 TABLET ORAL
Status: DISCONTINUED | OUTPATIENT
Start: 2021-04-11 | End: 2021-04-11

## 2021-04-11 RX ORDER — DIPHENHYDRAMINE HYDROCHLORIDE 50 MG/ML
50 INJECTION, SOLUTION INTRAMUSCULAR; INTRAVENOUS
Status: DISCONTINUED | OUTPATIENT
Start: 2021-04-11 | End: 2021-04-13 | Stop reason: HOSPADM

## 2021-04-11 RX ORDER — BENZTROPINE MESYLATE 1 MG/1
0.5 TABLET ORAL
Status: DISCONTINUED | OUTPATIENT
Start: 2021-04-11 | End: 2021-04-11

## 2021-04-11 RX ORDER — ACETAMINOPHEN 325 MG/1
650 TABLET ORAL
Status: DISCONTINUED | OUTPATIENT
Start: 2021-04-11 | End: 2021-04-13 | Stop reason: HOSPADM

## 2021-04-11 RX ORDER — BENZTROPINE MESYLATE 1 MG/1
1 TABLET ORAL
Status: DISCONTINUED | OUTPATIENT
Start: 2021-04-11 | End: 2021-04-11 | Stop reason: SDUPTHER

## 2021-04-11 RX ORDER — OLANZAPINE 5 MG/1
5 TABLET ORAL
Status: DISCONTINUED | OUTPATIENT
Start: 2021-04-11 | End: 2021-04-13 | Stop reason: HOSPADM

## 2021-04-11 RX ORDER — HALOPERIDOL 5 MG/ML
5 INJECTION INTRAMUSCULAR
Status: DISCONTINUED | OUTPATIENT
Start: 2021-04-11 | End: 2021-04-13 | Stop reason: HOSPADM

## 2021-04-11 RX ORDER — LORAZEPAM 2 MG/ML
1 INJECTION INTRAMUSCULAR
Status: DISCONTINUED | OUTPATIENT
Start: 2021-04-11 | End: 2021-04-13 | Stop reason: HOSPADM

## 2021-04-11 RX ORDER — HALOPERIDOL 5 MG/ML
2.5 INJECTION INTRAMUSCULAR
Status: DISCONTINUED | OUTPATIENT
Start: 2021-04-11 | End: 2021-04-11

## 2021-04-11 RX ORDER — DIPHENHYDRAMINE HYDROCHLORIDE 50 MG/ML
50 INJECTION, SOLUTION INTRAMUSCULAR; INTRAVENOUS
Status: DISCONTINUED | OUTPATIENT
Start: 2021-04-11 | End: 2021-04-11 | Stop reason: SDUPTHER

## 2021-04-11 RX ORDER — IBUPROFEN 400 MG/1
400 TABLET ORAL
Status: DISCONTINUED | OUTPATIENT
Start: 2021-04-11 | End: 2021-04-11

## 2021-04-11 RX ORDER — BENZTROPINE MESYLATE 1 MG/1
1 TABLET ORAL
Status: DISCONTINUED | OUTPATIENT
Start: 2021-04-11 | End: 2021-04-13 | Stop reason: HOSPADM

## 2021-04-11 RX ORDER — PHENOBARBITAL 32.4 MG/1
32.4 TABLET ORAL
Status: DISCONTINUED | OUTPATIENT
Start: 2021-04-11 | End: 2021-04-11

## 2021-04-11 RX ORDER — ACETAMINOPHEN 500 MG
1000 TABLET ORAL
Status: COMPLETED | OUTPATIENT
Start: 2021-04-11 | End: 2021-04-11

## 2021-04-11 RX ORDER — ACETAMINOPHEN 325 MG/1
650 TABLET ORAL
Status: DISCONTINUED | OUTPATIENT
Start: 2021-04-11 | End: 2021-04-11

## 2021-04-11 RX ORDER — OLANZAPINE 5 MG/1
5 TABLET ORAL
Status: DISCONTINUED | OUTPATIENT
Start: 2021-04-11 | End: 2021-04-11 | Stop reason: SDUPTHER

## 2021-04-11 RX ORDER — TRAZODONE HYDROCHLORIDE 50 MG/1
50 TABLET ORAL
Status: DISCONTINUED | OUTPATIENT
Start: 2021-04-11 | End: 2021-04-11

## 2021-04-11 RX ORDER — ADHESIVE BANDAGE
30 BANDAGE TOPICAL DAILY PRN
Status: DISCONTINUED | OUTPATIENT
Start: 2021-04-11 | End: 2021-04-11 | Stop reason: SDUPTHER

## 2021-04-11 RX ORDER — HALOPERIDOL 5 MG/ML
5 INJECTION INTRAMUSCULAR
Status: DISCONTINUED | OUTPATIENT
Start: 2021-04-11 | End: 2021-04-11 | Stop reason: SDUPTHER

## 2021-04-11 RX ORDER — ADHESIVE BANDAGE
30 BANDAGE TOPICAL DAILY PRN
Status: DISCONTINUED | OUTPATIENT
Start: 2021-04-11 | End: 2021-04-13 | Stop reason: HOSPADM

## 2021-04-11 RX ORDER — ADHESIVE BANDAGE
30 BANDAGE TOPICAL DAILY PRN
Status: DISCONTINUED | OUTPATIENT
Start: 2021-04-11 | End: 2021-04-11

## 2021-04-11 RX ORDER — DIPHENHYDRAMINE HYDROCHLORIDE 50 MG/ML
25 INJECTION, SOLUTION INTRAMUSCULAR; INTRAVENOUS
Status: DISCONTINUED | OUTPATIENT
Start: 2021-04-11 | End: 2021-04-11

## 2021-04-11 RX ORDER — ACETAMINOPHEN 325 MG/1
650 TABLET ORAL
Status: DISCONTINUED | OUTPATIENT
Start: 2021-04-11 | End: 2021-04-11 | Stop reason: SDUPTHER

## 2021-04-11 RX ORDER — TRAZODONE HYDROCHLORIDE 50 MG/1
50 TABLET ORAL
Status: DISCONTINUED | OUTPATIENT
Start: 2021-04-11 | End: 2021-04-13 | Stop reason: HOSPADM

## 2021-04-11 RX ORDER — HYDROXYZINE 50 MG/1
50 TABLET, FILM COATED ORAL
Status: DISCONTINUED | OUTPATIENT
Start: 2021-04-11 | End: 2021-04-11

## 2021-04-11 RX ORDER — LORAZEPAM 2 MG/ML
1 INJECTION INTRAMUSCULAR
Status: DISCONTINUED | OUTPATIENT
Start: 2021-04-11 | End: 2021-04-11

## 2021-04-11 RX ADMIN — ACETAMINOPHEN 1000 MG: 500 TABLET ORAL at 15:51

## 2021-04-11 NOTE — ED TRIAGE NOTES
Triage: Pt arrives via EMS after a reported intentional overdose. Pt reportedly took 10 xanax tablets in an effort to end her life. Per EMS pt was being monitored by her family for suicidal ideation. Pt arrives awake and alert on room air.

## 2021-04-11 NOTE — ROUTINE PROCESS
TRANSFER - OUT REPORT: 
 
Verbal report given to Ileana Arriaga on [facility]  being transferred to [place] [id number](8) ( for routine progression of care Report consisted of patients Situation, Background, Assessment and  
Recommendations(SBAR). Information from the following report(s) SBAR, Kardex, ED Summary, Accordion and Recent Results was reviewed with the receiving nurse. Opportunity for questions and clarification was provided.    
 
Patient transported with: 
 Patricia Rodriguez and ZI
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
## 2021-04-11 NOTE — ED PROVIDER NOTES
HPI .  Patient has a history of depression, anxiety. She lives in an apartment. History is from EMS. EMS reports that a former boyfriend moved into her apartment yesterday surprising the patient. She went to stay with her family and family had her on a \"suicide watch. \"  About 1 hour prior to arrival patient took 10 Xanax. She denied taking any other medications. This was an attempt to harm herself.     Past Medical History:   Diagnosis Date    Psychiatric disorder     depression    Psychiatric disorder     anxiety       Past Surgical History:   Procedure Laterality Date    HX DILATION AND CURETTAGE      HX HEENT      tonsillectomy    HX TONSILLECTOMY           Family History:   Problem Relation Age of Onset    Thyroid Disease Mother     Asthma Father     No Known Problems Sister     No Known Problems Brother        Social History     Socioeconomic History    Marital status: SINGLE     Spouse name: Not on file    Number of children: Not on file    Years of education: Not on file    Highest education level: Not on file   Occupational History    Not on file   Social Needs    Financial resource strain: Not on file    Food insecurity     Worry: Not on file     Inability: Not on file    Transportation needs     Medical: Not on file     Non-medical: Not on file   Tobacco Use    Smoking status: Never Smoker    Smokeless tobacco: Never Used    Tobacco comment: smoked a few months at age 25mths   Substance and Sexual Activity    Alcohol use: Yes     Comment: Social    Drug use: No    Sexual activity: Yes     Partners: Male     Birth control/protection: Pill   Lifestyle    Physical activity     Days per week: Not on file     Minutes per session: Not on file    Stress: Not on file   Relationships    Social connections     Talks on phone: Not on file     Gets together: Not on file     Attends Anglican service: Not on file     Active member of club or organization: Not on file     Attends meetings of clubs or organizations: Not on file     Relationship status: Not on file    Intimate partner violence     Fear of current or ex partner: Not on file     Emotionally abused: Not on file     Physically abused: Not on file     Forced sexual activity: Not on file   Other Topics Concern    Not on file   Social History Narrative    Not on file         ALLERGIES: Patient has no known allergies. Review of Systems   All other systems reviewed and are negative. Vitals:    04/11/21 1013   BP: 93/61   Pulse: 84   Resp: 19   Temp: 98.6 °F (37 °C)   SpO2: 96%   Weight: 149.7 kg (330 lb)   Height: 5' 6\" (1.676 m)            Physical Exam  Vitals signs and nursing note reviewed. Constitutional:       Appearance: She is well-developed. HENT:      Head: Normocephalic and atraumatic. Eyes:      Pupils: Pupils are equal, round, and reactive to light. Neck:      Musculoskeletal: Normal range of motion and neck supple. Cardiovascular:      Rate and Rhythm: Normal rate and regular rhythm. Heart sounds: Normal heart sounds. No murmur. No friction rub. No gallop. Pulmonary:      Effort: Pulmonary effort is normal. No respiratory distress. Breath sounds: No wheezing or rales. Abdominal:      Palpations: Abdomen is soft. Tenderness: There is no abdominal tenderness. There is no rebound. Musculoskeletal: Normal range of motion. General: No tenderness. Skin:     Findings: No erythema. Neurological:      Mental Status: She is alert. Cranial Nerves: No cranial nerve deficit. Comments: Motor; symmetric   Psychiatric:         Behavior: Behavior normal.          MDM       Procedures        ED EKG interpretation:  Rhythm: normal sinus rhythm; and regular . Rate (approx.): 80; Axis: normal; P wave: normal; QRS interval: normal ; ST/T wave: normal; in  Lead: ; Other findings: . This EKG was interpreted by Duran Castro MD,ED Provider.  10:22 AM

## 2021-04-11 NOTE — BH NOTES
TRANSFER - IN REPORT:    Verbal report received from [address] [facility]  being received from ED for routine progression of care      Report consisted of patients Situation, Background, Assessment and   Recommendations(SBAR). Information from the following report(s) SBAR was reviewed with the receiving nurse. Opportunity for questions and clarification was provided. Assessment completed upon patients arrival to unit and care assumed.
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
## 2021-04-11 NOTE — ED NOTES
RN spoke with Edgar Camargo at [address] [facility] control. RN instructed to watch patient until 1300 and if her labs come back normal patient can be disposed.

## 2021-04-12 PROCEDURE — 74011250637 HC RX REV CODE- 250/637: Performed by: PSYCHIATRY & NEUROLOGY

## 2021-04-12 PROCEDURE — 65220000003 HC RM SEMIPRIVATE PSYCH

## 2021-04-12 PROCEDURE — 74011250637 HC RX REV CODE- 250/637: Performed by: NURSE PRACTITIONER

## 2021-04-12 RX ORDER — SERTRALINE HYDROCHLORIDE 100 MG/1
150 TABLET, FILM COATED ORAL DAILY
COMMUNITY
End: 2021-04-13

## 2021-04-12 RX ORDER — SERTRALINE HYDROCHLORIDE 50 MG/1
200 TABLET, FILM COATED ORAL DAILY
Status: DISCONTINUED | OUTPATIENT
Start: 2021-04-12 | End: 2021-04-13 | Stop reason: HOSPADM

## 2021-04-12 RX ORDER — CLONIDINE HYDROCHLORIDE 0.1 MG/1
0.1 TABLET ORAL
COMMUNITY
End: 2021-04-13

## 2021-04-12 RX ORDER — LEVOCETIRIZINE DIHYDROCHLORIDE 5 MG/1
5 TABLET, FILM COATED ORAL DAILY
COMMUNITY

## 2021-04-12 RX ORDER — CLONIDINE HYDROCHLORIDE 0.1 MG/1
0.1 TABLET ORAL
Status: DISCONTINUED | OUTPATIENT
Start: 2021-04-12 | End: 2021-04-13 | Stop reason: HOSPADM

## 2021-04-12 RX ADMIN — HYDROXYZINE HYDROCHLORIDE 50 MG: 50 TABLET, FILM COATED ORAL at 17:35

## 2021-04-12 RX ADMIN — ACETAMINOPHEN 650 MG: 325 TABLET ORAL at 17:35

## 2021-04-12 RX ADMIN — ACETAMINOPHEN 650 MG: 325 TABLET ORAL at 03:03

## 2021-04-12 RX ADMIN — CLONIDINE HYDROCHLORIDE 0.1 MG: 0.1 TABLET ORAL at 22:11

## 2021-04-12 RX ADMIN — ACETAMINOPHEN 650 MG: 325 TABLET ORAL at 09:26
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
## 2021-04-12 NOTE — INTERDISCIPLINARY ROUNDS
Behavioral Health Interdisciplinary Rounds Patient Name: Caitlin Hay  Age: 37 y.o. Room/Bed:  746/02 Primary Diagnosis: <principal problem not specified> Admission Status: Voluntary Readmission within 30 days: no 
Power of  in place: no 
Patient requires a blocked bed: no          Reason for blocked bed: VTE Prophylaxis: No 
 
Mobility needs/Fall risk: no 
Flu Vaccine : no  
Nutritional Plan: no 
Consults:         
Labs/Testing due today?: no 
 
Sleep hours:  7 Participation in Care/Groups:   
Medication Compliant? :New admission: no scheduled medications PRNS (last 24 hours): Pain Restraints (last 24 hours):  no 
  
CIWA (range last 24 hours): CIWA-Ar Total: 0  
COWS (range last 24 hours): Alcohol screening (AUDIT) completed - If applicable, date SBIRT discussed in treatment team AND documented:  
AUDIT Screen Score: Document Brief Intervention (corresponds directly with the 5 A's, Ask, Advise, Assess, Assist, and Arrange): At- Risk Patients (Score 7-15 for women; 8-15 for men) Discuss concern patient is drinking at unhealthy levels known to increase risk of alcohol-related health problems. Is Patient ready to commit to change? If No: 
 Encourage reflection  Discuss short term and long term health risks of consuming alcohol  Barriers to change  Reaffirm willingness to help / Educational materials provided If Yes: 
 Set goal 
 Plan  Educational materials provided Harmful use or Dependence (Score 16 or greater)  Discuss short term and long term health risks of consuming alcohol  Recommendations  Negotiate drinking goal 
 Recommend addiction specialist/center  Arrange follow-up appointments.  
 
Tobacco - patient is a smoker: Have You Used Tobacco in the Past 30 Days: No 
Illegal Drugs use: Have You Used Any Illegal Substances Over the Past 12 Months: No 
 
24 hour chart check complete:  
 
Patient goal(s) for today: meet treatment team, acclimate to unit Treatment team focus/goals: assess needs for treatment and safe discharge,  
Progress note:  Pt voluntarily admitted for depression and accidental OD. She is alert, oriented, angry, tearful and denying SI and intentional OD 
 
LOS:  1  Expected LOS: 2-3 Financial concerns/prescription coverage: UNINSURED over income for [employer] Family contact: friend, Janett Whittaker, ([phone]) KAYCE signed Family requesting physician contact today:  no 
Discharge plan: return home Access to weapons : No 
Outpatient provider(s): Niko Cameron LCSW at [facility] and Madison Cunningham NP at [facility] Patient's preferred phone number for follow up call : [phone] Patient's preferred e-mail address : 
 
Participating treatment team members: FARHANA Bennett; Dr Teri Purcell; Rose Abdullahi, RN; [place], FabianD

## 2021-04-12 NOTE — PROGRESS NOTES
Admission Medication Reconciliation:    Information obtained from:  patient interview, communication with [employer] (phone number: [phone]), Insurance claims data, and [facility]  RxQuery data available¹:  YES (outdated)    Comments/Recommendations: Updated PTA meds/reviewed patient's allergies. 1)  Patient reports PTA medications during treatment team. Pharmacist called [email or web address] to confirm doses. 2)  The [place] Prescription Monitoring Program () was assessed to determine fill history of any controlled medications. The patient has not filled any controlled medications lately. - 10/31/20: zolpidem 10mg, #30 for 30 day supply  - 10/5/20: zolpidem 10mg, #30 for 30 day supply  - 9/29/20: zolpidem ER 6.25mg, #30 for 30 day supply  - 9/18/20: alprazolam 1mg, #30 for 30 day supply    3)  Medication changes (since last review): Added  - sertraline 150mg daily  - clonidine 0.1mg TID PRN    Removed  - amoxicillin, benzonatate, ondansetron, propranolol, cetirizine/pseudoephedrine   ¹RxQuery pharmacy benefit data reflects medications filled and processed through the patient's insurance, however this data does NOT capture whether the medication was picked up or is currently being taken by the patient. Allergies:  Patient has no known allergies. Significant PMH/Disease States:   Past Medical History:   Diagnosis Date    Psychiatric disorder     depression    Psychiatric disorder     anxiety       Chief Complaint for this Admission:    Chief Complaint   Patient presents with    Drug Overdose     Prior to Admission Medications:   Prior to Admission Medications   Prescriptions Last Dose Informant Taking? cloNIDine HCL (CATAPRES) 0.1 mg tablet   Yes   Sig: Take 0.1 mg by mouth three times daily as needed   levocetirizine (Xyzal) 5 mg tablet   Yes   Sig: Take 5 mg by mouth daily. sertraline (ZOLOFT) 100 mg tablet   Yes   Sig: Take 150 mg by mouth daily.       Facility-Administered Medications: None Lindsey Foster, PHARMD

## 2021-04-12 NOTE — PROGRESS NOTES
Problem: Depressed Mood (Adult/Pediatric)  Goal: *STG: Participates in treatment plan  Outcome: Progressing Towards Goal  Note: Out on unit for meals only, in bed resting, reports feeling sadness, grief and embarrassment. Regret of her excessive use of xanax. Feeling abandon and unworthy related broken engagement and learning of ex-fiance infidelity. Denies SI, states she is not suicidal and that she wants to d/c home today. Daily goal is to discuss d/c plans and length of stay. Staff focus is on offering support and reassurance. Goal: *STG: Verbalizes anger, guilt, and other feelings in a constructive manor  Outcome: Progressing Towards Goal  Goal: *STG: Attends activities and groups  Outcome: Progressing Towards Goal  Goal: *STG: Complies with medication therapy  Outcome: Progressing Towards Goal  Goal: Interventions  Outcome: Progressing Towards Goal       1356: discussed benefit of staying on unit for the day and working w tx team on a discharge plan.  Staff encouraged pt to increase time out of bed and talk with friend Dar Richardson

## 2021-04-12 NOTE — PROGRESS NOTES
Problem: Depressed Mood (Adult/Pediatric)  Goal: *STG: Participates in treatment plan  Outcome: Progressing Towards Goal  Goal: *STG: Verbalizes anger, guilt, and other feelings in a constructive manor  Outcome: Progressing Towards Goal  Goal: *STG: Attends activities and groups  Outcome: Progressing Towards Goal  Goal: *STG: Complies with medication therapy  Outcome: Progressing Towards Goal  Goal: Interventions  Outcome: Progressing Towards Goal   Pt will verbalize any increase in depression sx too staff  Pt is engaged on unit  No voiced complaints or acute distress at present

## 2021-04-12 NOTE — PROGRESS NOTES
PRN Medication Documentation    Specific patient behavior that led to need for PRN medication: pt c/o back pain  PRN medication given: tylenol  Patient response/effectiveness of PRN medication: will assess      Problem: Depressed Mood (Adult/Pediatric)  Goal: *STG: Remains safe in hospital  Outcome: Progressing Towards Goal     Problem: Falls - Risk of  Goal: *Absence of Falls  Description: Document Hayes Fall Risk and appropriate interventions in the flowsheet.   Outcome: Progressing Towards Goal  Note: Fall Risk Interventions:            Medication Interventions: Teach patient to arise slowly

## 2021-04-12 NOTE — BH NOTES
GROUP THERAPY PROGRESS NOTE    Patient did not participate in Coping Skills Group.      Sander Ramey, Supervisee in Social Work

## 2021-04-12 NOTE — BH NOTES
GROUP THERAPY PROGRESS NOTE    Patient did not participate in Process Group.      Sander Ramey, Supervisee in Social Work

## 2021-04-12 NOTE — PROGRESS NOTES
Patient asleep at shift change. Will educate on fall precautions once awake. Problem: Depressed Mood (Adult/Pediatric)  Goal: *STG: Complies with medication therapy  Outcome: Progressing Towards Goal     Problem: Falls - Risk of  Goal: *Absence of Falls  Description: Document Hayes Fall Risk and appropriate interventions in the flowsheet. Outcome: Progressing Towards Goal  Note: Fall Risk Interventions:  Mobility Interventions: Assess mobility with egress test         Medication Interventions: Teach patient to arise slowly    Elimination Interventions:  Toilet paper/wipes in reach    History of Falls Interventions: Door open when patient unattended

## 2021-04-13 VITALS
RESPIRATION RATE: 16 BRPM | HEIGHT: 66 IN | WEIGHT: 293 LBS | BODY MASS INDEX: 47.09 KG/M2 | DIASTOLIC BLOOD PRESSURE: 62 MMHG | SYSTOLIC BLOOD PRESSURE: 107 MMHG | HEART RATE: 82 BPM | TEMPERATURE: 97.9 F | OXYGEN SATURATION: 97 %

## 2021-04-13 PROCEDURE — 74011250637 HC RX REV CODE- 250/637: Performed by: NURSE PRACTITIONER

## 2021-04-13 PROCEDURE — 74011250637 HC RX REV CODE- 250/637: Performed by: PSYCHIATRY & NEUROLOGY

## 2021-04-13 RX ORDER — CLONIDINE HYDROCHLORIDE 0.1 MG/1
0.1 TABLET ORAL
Qty: 30 TAB | Refills: 0 | Status: SHIPPED | OUTPATIENT
Start: 2021-04-13

## 2021-04-13 RX ORDER — SERTRALINE HYDROCHLORIDE 100 MG/1
200 TABLET, FILM COATED ORAL DAILY
Qty: 60 TAB | Refills: 0 | Status: SHIPPED | OUTPATIENT
Start: 2021-04-14

## 2021-04-13 RX ADMIN — ACETAMINOPHEN 650 MG: 325 TABLET ORAL at 12:01

## 2021-04-13 RX ADMIN — SERTRALINE 200 MG: 50 TABLET, FILM COATED ORAL at 09:35

## 2021-04-13 NOTE — PROGRESS NOTES
Problem: Depressed Mood (Adult/Pediatric)  Goal: *STG: Participates in treatment plan  Outcome: Progressing Towards Goal  Note: Out on unit. Passively engaged. Mood and affect brighter when talking about support system and d/c plans. Denies SI, daily goal is to d/c home. Spoke with Makenzie Rendon , friend/support system, she states she feels pt is safe to d/c to her house and that a locked box for medication safety has already been purchased. Reviewed medications and d/c plans.  Will update tx team   Goal: *STG: Verbalizes anger, guilt, and other feelings in a constructive manor  Outcome: Progressing Towards Goal  Goal: *STG: Attends activities and groups  Outcome: Progressing Towards Goal  Goal: *STG: Complies with medication therapy  Outcome: Progressing Towards Goal  Goal: Interventions  Outcome: Progressing Towards Goal

## 2021-04-13 NOTE — BH NOTES
GROUP THERAPY PROGRESS NOTE    Patient did not participate in Substance Abuse group.      Sander Ramey, Supervisee in Social Work

## 2021-04-13 NOTE — INTERDISCIPLINARY ROUNDS
Behavioral Health Interdisciplinary Rounds Patient Name: Javon Paz  Age: 37 y.o. Room/Bed:  6/ Primary Diagnosis: <principal problem not specified> Admission Status: Voluntary Readmission within 30 days: no 
Power of  in place: no 
Patient requires a blocked bed: no          Reason for blocked bed: VTE Prophylaxis: No 
 
Mobility needs/Fall risk: no 
Flu Vaccine : no  
Nutritional Plan: no 
Consults:         
Labs/Testing due today?: no 
 
Sleep hours: 8 Participation in Care/Groups:  yes Medication Compliant?: Yes PRNS (last 24 hours): Antianxiety and Pain Restraints (last 24 hours):  no 
  
CIWA (range last 24 hours): CIWA-Ar Total: 2 COWS (range last 24 hours): Alcohol screening (AUDIT) completed - If applicable, date SBIRT discussed in treatment team AND documented:  
AUDIT Screen Score: Tobacco - patient is a smoker: Have You Used Tobacco in the Past 30 Days: No 
Illegal Drugs use: Have You Used Any Illegal Substances Over the Past 12 Months: No 
 
24 hour chart check complete: yes Patient goal(s) for today: prepare for discharge Treatment team focus/goals: reconcile medications and facilitate discharge Progress note: Pt is alert, oriented, clam, cooperative and psychiatrically stable for discharge. LOS:  2  Expected LOS: 2 Financial concerns/prescription coverage: UNINSURED over income for [employer] Family contact: friend, Filippo Garcia, ([phone]) KAYCE signed Family requesting physician contact today:  no 
Discharge plan: return home Access to weapons : No 
Outpatient provider(s): Jayy Pandya LCSW at [facility] and Dontrell Tidwell NP at [facility] Patient's preferred phone number for follow up call : [phone] Patient's preferred e-mail address : 
  
Participating treatment team members: FARHANA Richards; Belem Pelaez NP; Chelita Saldana, RN; Mariana Cervantes, FabianD
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
## 2021-04-13 NOTE — BH NOTES
PRN Medication Documentation    Specific patient behavior that led to need for PRN medication: c/o anxiety  Staff interventions attempted prior to PRN being given: coping skills PRN medication given:atarax  Patient response/effectiveness of PRN medication: eva aware

## 2021-04-13 NOTE — DISCHARGE INSTRUCTIONS
DISCHARGE SUMMARY    NAME:Isabel Myers  : 1985  MRN: [id number]    The patient [facility] exhibits the ability to control behavior in a less restrictive environment. Patient's level of functioning is improving. No assaultive/destructive behavior has been observed for the past 24 hours. No suicidal/homicidal threat or behavior has been observed for the past 24 hours. There is no evidence of serious medication side effects. Patient has not been in physical or protective restraints for at least the past 24 hours. If weapons involved, how are they secured? NA    Is patient aware of and in agreement with discharge plan? Yes    Arrangements for medication:  Prescriptions given to patient, 30 day supply. Copy of discharge instructions to provider?:  [facility]  ([phone])    Arrangements for transportation home:  Friend to      Keep all follow up appointments as scheduled, continue to take prescribed medications per physician instructions. Mental health crisis number:  403 or your local mental health crisis line number at [place] [address] Emergency WARM LINE      [phone] ([phone])      M-F: 9am to 9pm      Sat & Sun: 5pm - 9pm  National suicide prevention lines:                             9-379-FNFPAFM ([phone])       7-089-263-TALK ([phone])    Crisis Text Line:  Text HOME to [address] from Nurse    PATIENT INSTRUCTIONS:      What to do at Home:  Recommended activity: Activity as tolerated,     . *  Please give a list of your current medications to your Primary Care Provider. *  Please update this list whenever your medications are discontinued, doses are      changed, or new medications (including over-the-counter products) are added. *  Please carry medication information at all times in case of emergency situations.     These are general instructions for a healthy lifestyle:    No smoking/ No tobacco products/ Avoid exposure to second hand smoke  Surgeon General's Warning:  Quitting smoking now greatly reduces serious risk to your health. Obesity, smoking, and sedentary lifestyle greatly increases your risk for illness    A healthy diet, regular physical exercise & weight monitoring are important for maintaining a healthy lifestyle    You may be retaining fluid if you have a history of heart failure or if you experience any of the following symptoms:  Weight gain of 3 pounds or more overnight or 5 pounds in a week, increased swelling in our hands or feet or shortness of breath while lying flat in bed. Please call your doctor as soon as you notice any of these symptoms; do not wait until your next office visit. The discharge information has been reviewed with the patient. The patient verbalized understanding. Discharge medications reviewed with the patient and appropriate educational materials and side effects teaching were provided.   ___________________________________________________________________________________________________________________________________
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
## 2021-04-13 NOTE — SUICIDE SAFETY PLAN
SAFETY PLAN    A suicide Safety Plan is a document that supports someone when they are having thoughts of suicide. Warning Signs that indicate a suicidal crisis may be developing: What (situations, thoughts, feelings, body sensations, behaviors, etc.) do you experience that lets you know you are beginning to think about suicide? 1. Being with loved ones that are gone   2. Thinking it won't hurt anyone if I'm gone. 3. No point in living     Internal Coping Strategies:  What things can I do (relaxation techniques, hobbies, physical activities, etc.) to take my mind off my problems without contacting another person? 1. Be with a friend   2. Listen to music   3. Sleeping     People and social settings that provide distraction: Who can I call or where can I go to distract me? 1. Name: Ubaldo Kincaid  Phone: [phone]  2. Name: Daniel Bianchi Phone: [phone]  3. Place: [facility]             4. Place: [address] [place] [address] whom I can ask for help: Who can I call when I need help - for example, friends, family, clergy, someone else? 1. Name: Ubaldo Kincaid  Phone: [phone]  2. Name: Daniel Bianchi Phone: [phone]      Professionals or [address] [place] [address] agencies I can contact during a crisis: Who can I call for help - for example, my doctor, my psychiatrist, my psychologist, a mental health provider, a suicide hotline? 1. Clinician Name: Amna Roa NP   Phone: phone: [phone]    2. Clinician Name: Asmita Carr LCSW  Phone: [phone]       3. Suicide Prevention Lifeline: 2-373-251-TALK (5912)    4. [address] [place] [address] Emergency Services -  for example, [facility]: [phone]      Emergency Services Address: [address] [facility], [address] [facility]      Emergency Services Phone: [phone]    Making the environment safe: How can I make my environment (house/apartment/living space) safer?  For example, can I remove guns, medications, and other items? 1. I don't have guns, but limit medications I have.

## 2021-04-13 NOTE — PROGRESS NOTES
Problem: Discharge Planning  Goal: *Discharge to safe environment  Outcome: Progressing Towards Goal  Note: Patient identifies home as a safe environment and plans to return upon discharge. Patient is connected to outpatient services. Patient has supportive friend. Problem: Discharge Planning  Goal: *Knowledge of medication management  Outcome: Progressing Towards Goal  Note: Patient agrees to take medications as prescribed. Problem: Discharge Planning  Goal: *Knowledge of discharge instructions  Outcome: Progressing Towards Goal  Note: Patient verbalizes understanding of goals for treatment and safe discharge.

## 2021-04-13 NOTE — PROGRESS NOTES
Problem: Falls - Risk of  Goal: *Absence of Falls  Description: Document Sue Pedraza Fall Risk and appropriate interventions in the flowsheet. Outcome: Progressing Towards Goal  Note: Fall Risk Interventions:  Mobility Interventions: Assess mobility with egress test         Medication Interventions: Teach patient to arise slowly    Elimination Interventions:  Toilet paper/wipes in reach    History of Falls Interventions: Door open when patient unattended       Pt appears asleep in bed, NAD, even respirations, will continue to monitor q15min

## 2021-04-13 NOTE — PROGRESS NOTES
Pharmacist Discharge Medication Reconciliation    Discharging Provider: Nikole Cortes NP    Significant PMH:   Past Medical History:   Diagnosis Date    Psychiatric disorder     depression    Psychiatric disorder     anxiety     Chief Complaint for this Admission:   Chief Complaint   Patient presents with    Drug Overdose     Allergies: Patient has no known allergies. Discharge Medications:   Current Discharge Medication List        CONTINUE these medications which have CHANGED    Details   cloNIDine HCL (CATAPRES) 0.1 mg tablet Take 1 Tab by mouth nightly. Indications: high blood pressure  Qty: 30 Tab, Refills: 0      sertraline (ZOLOFT) 100 mg tablet Take 2 Tabs by mouth daily. Indications: anxiousness associated with depression  Qty: 60 Tab, Refills: 0           CONTINUE these medications which have NOT CHANGED    Details   levocetirizine (Xyzal) 5 mg tablet Take 5 mg by mouth daily.            The patient's chart, MAR and AVS were reviewed by Lindsey Foster PHARMD.

## 2021-04-16 NOTE — PROGRESS NOTES
Reached out to patient at [phone] for follow up. Voicemail came on and no message left due to confidentiality.

## 2021-04-19 NOTE — PROGRESS NOTES
Reached out to patient at [phone] for follow up. Patient has connect with both providers and is doing well.

## 2022-03-19 PROBLEM — F32.9 MAJOR DEPRESSION: Status: ACTIVE | Noted: 2021-04-11

## 2023-01-19 ENCOUNTER — TRANSCRIBE ORDER (OUTPATIENT)
Dept: SCHEDULING | Age: 38
End: 2023-01-19

## 2023-01-19 DIAGNOSIS — Z78.9 NON-SMOKER: ICD-10-CM

## 2023-01-19 DIAGNOSIS — H73.12 CHRONIC MYRINGITIS OF LEFT EAR: Primary | ICD-10-CM

## 2023-01-19 DIAGNOSIS — Z00.8 OTHER SPECIFIED GENERAL MEDICAL EXAMINATION: ICD-10-CM

## 2023-02-12 ENCOUNTER — HOSPITAL ENCOUNTER (OUTPATIENT)
Dept: CT IMAGING | Age: 38
Discharge: HOME | End: 2023-02-12
Attending: OTOLARYNGOLOGY
Payer: COMMERCIAL

## 2023-02-12 DIAGNOSIS — Z00.8 OTHER SPECIFIED GENERAL MEDICAL EXAMINATION: ICD-10-CM

## 2023-02-12 DIAGNOSIS — Z78.9 NON-SMOKER: ICD-10-CM

## 2023-02-12 DIAGNOSIS — H73.12 CHRONIC MYRINGITIS OF LEFT EAR: ICD-10-CM

## 2023-02-12 PROCEDURE — 70480 CT ORBIT/EAR/FOSSA W/O DYE: CPT

## 2023-05-16 LAB
ALBUMIN SERPL-MCNC: 3.2 G/DL (ref 3.5–5)
ALBUMIN/GLOB SERPL: 0.7 (ref 1.1–2.2)
ALP SERPL-CCNC: 62 U/L (ref 45–117)
ALT SERPL-CCNC: 21 U/L (ref 12–78)
ANION GAP SERPL CALC-SCNC: 6 MMOL/L (ref 5–15)
APPEARANCE UR: ABNORMAL
AST SERPL-CCNC: 15 U/L (ref 15–37)
BACTERIA URNS QL MICRO: ABNORMAL /HPF
BASOPHILS # BLD: 0 K/UL (ref 0–0.1)
BASOPHILS NFR BLD: 1 % (ref 0–1)
BILIRUB SERPL-MCNC: 0.2 MG/DL (ref 0.2–1)
BILIRUB UR QL: NEGATIVE
BUN SERPL-MCNC: 18 MG/DL (ref 6–20)
BUN/CREAT SERPL: 18 (ref 12–20)
CALCIUM SERPL-MCNC: 8.6 MG/DL (ref 8.5–10.1)
CHLORIDE SERPL-SCNC: 110 MMOL/L (ref 97–108)
CO2 SERPL-SCNC: 24 MMOL/L (ref 21–32)
COLOR UR: ABNORMAL
COMMENT:: NORMAL
CREAT SERPL-MCNC: 1.02 MG/DL (ref 0.55–1.02)
DIFFERENTIAL METHOD BLD: NORMAL
EOSINOPHIL # BLD: 0.2 K/UL (ref 0–0.4)
EOSINOPHIL NFR BLD: 2 % (ref 0–7)
EPITH CASTS URNS QL MICRO: ABNORMAL /LPF
ERYTHROCYTE [DISTWIDTH] IN BLOOD BY AUTOMATED COUNT: 13 % (ref 11.5–14.5)
GLOBULIN SER CALC-MCNC: 4.4 G/DL (ref 2–4)
GLUCOSE SERPL-MCNC: 82 MG/DL (ref 65–100)
GLUCOSE UR STRIP.AUTO-MCNC: NEGATIVE MG/DL
HCG UR QL: NEGATIVE
HCT VFR BLD AUTO: 40.9 % (ref 35–47)
HGB BLD-MCNC: 13.3 G/DL (ref 11.5–16)
HGB UR QL STRIP: ABNORMAL
HYALINE CASTS URNS QL MICRO: ABNORMAL /LPF (ref 0–2)
IMM GRANULOCYTES # BLD AUTO: 0 K/UL (ref 0–0.04)
IMM GRANULOCYTES NFR BLD AUTO: 0 % (ref 0–0.5)
KETONES UR QL STRIP.AUTO: NEGATIVE MG/DL
LEUKOCYTE ESTERASE UR QL STRIP.AUTO: ABNORMAL
LIPASE SERPL-CCNC: 143 U/L (ref 73–393)
LYMPHOCYTES # BLD: 2.3 K/UL (ref 0.8–3.5)
LYMPHOCYTES NFR BLD: 31 % (ref 12–49)
MCH RBC QN AUTO: 29.6 PG (ref 26–34)
MCHC RBC AUTO-ENTMCNC: 32.5 G/DL (ref 30–36.5)
MCV RBC AUTO: 90.9 FL (ref 80–99)
MONOCYTES # BLD: 0.4 K/UL (ref 0–1)
MONOCYTES NFR BLD: 6 % (ref 5–13)
NEUTS SEG # BLD: 4.5 K/UL (ref 1.8–8)
NEUTS SEG NFR BLD: 60 % (ref 32–75)
NITRITE UR QL STRIP.AUTO: NEGATIVE
NRBC # BLD: 0 K/UL (ref 0–0.01)
NRBC BLD-RTO: 0 PER 100 WBC
PH UR STRIP: 5.5 (ref 5–8)
PLATELET # BLD AUTO: 278 K/UL (ref 150–400)
PMV BLD AUTO: 10.4 FL (ref 8.9–12.9)
POTASSIUM SERPL-SCNC: 3.9 MMOL/L (ref 3.5–5.1)
PROT SERPL-MCNC: 7.6 G/DL (ref 6.4–8.2)
PROT UR STRIP-MCNC: NEGATIVE MG/DL
RBC # BLD AUTO: 4.5 M/UL (ref 3.8–5.2)
RBC #/AREA URNS HPF: ABNORMAL /HPF (ref 0–5)
SODIUM SERPL-SCNC: 140 MMOL/L (ref 136–145)
SP GR UR REFRACTOMETRY: >1.03 (ref 1–1.03)
SPECIMEN HOLD: NORMAL
SPECIMEN HOLD: NORMAL
UROBILINOGEN UR QL STRIP.AUTO: 1 EU/DL (ref 0.2–1)
WBC # BLD AUTO: 7.5 K/UL (ref 3.6–11)
WBC URNS QL MICRO: ABNORMAL /HPF (ref 0–4)

## 2023-05-16 PROCEDURE — 81025 URINE PREGNANCY TEST: CPT

## 2023-05-16 PROCEDURE — 80053 COMPREHEN METABOLIC PANEL: CPT

## 2023-05-16 PROCEDURE — 85025 COMPLETE CBC W/AUTO DIFF WBC: CPT

## 2023-05-16 PROCEDURE — 36415 COLL VENOUS BLD VENIPUNCTURE: CPT

## 2023-05-16 PROCEDURE — 83690 ASSAY OF LIPASE: CPT

## 2023-05-16 PROCEDURE — 96365 THER/PROPH/DIAG IV INF INIT: CPT

## 2023-05-16 PROCEDURE — 96375 TX/PRO/DX INJ NEW DRUG ADDON: CPT

## 2023-05-16 PROCEDURE — 81001 URINALYSIS AUTO W/SCOPE: CPT

## 2023-05-16 PROCEDURE — 99284 EMERGENCY DEPT VISIT MOD MDM: CPT

## 2023-05-16 ASSESSMENT — PAIN SCALES - GENERAL: PAINLEVEL_OUTOF10: 8

## 2023-05-17 ENCOUNTER — HOSPITAL ENCOUNTER (EMERGENCY)
Facility: HOSPITAL | Age: 38
Discharge: HOME | End: 2023-05-17
Attending: EMERGENCY MEDICINE
Payer: COMMERCIAL

## 2023-05-17 VITALS
SYSTOLIC BLOOD PRESSURE: 145 MMHG | TEMPERATURE: 97.3 F | RESPIRATION RATE: 16 BRPM | OXYGEN SATURATION: 100 % | DIASTOLIC BLOOD PRESSURE: 87 MMHG | BODY MASS INDEX: 59.03 KG/M2 | WEIGHT: 293 LBS | HEART RATE: 64 BPM

## 2023-05-17 DIAGNOSIS — N30.00 ACUTE CYSTITIS WITHOUT HEMATURIA: Primary | ICD-10-CM

## 2023-05-17 DIAGNOSIS — M54.6 ACUTE BILATERAL THORACIC BACK PAIN: ICD-10-CM

## 2023-05-17 PROCEDURE — 2580000003 HC RX 258: Performed by: EMERGENCY MEDICINE

## 2023-05-17 PROCEDURE — 6360000002 HC RX W HCPCS: Performed by: EMERGENCY MEDICINE

## 2023-05-17 RX ORDER — NAPROXEN 500 MG/1
500 TABLET ORAL 2 TIMES DAILY
Qty: 60 TABLET | Refills: 0 | Status: SHIPPED | OUTPATIENT
Start: 2023-05-17 | End: 2023-05-17 | Stop reason: SDUPTHER

## 2023-05-17 RX ORDER — KETOROLAC TROMETHAMINE 30 MG/ML
30 INJECTION, SOLUTION INTRAMUSCULAR; INTRAVENOUS
Status: COMPLETED | OUTPATIENT
Start: 2023-05-17 | End: 2023-05-17

## 2023-05-17 RX ORDER — CEFDINIR 300 MG/1
300 CAPSULE ORAL 2 TIMES DAILY
Qty: 20 CAPSULE | Refills: 0 | Status: SHIPPED | OUTPATIENT
Start: 2023-05-17 | End: 2023-05-27

## 2023-05-17 RX ORDER — NAPROXEN 500 MG/1
500 TABLET ORAL 2 TIMES DAILY
Qty: 60 TABLET | Refills: 0 | Status: SHIPPED | OUTPATIENT
Start: 2023-05-17

## 2023-05-17 RX ORDER — CEFDINIR 300 MG/1
300 CAPSULE ORAL 2 TIMES DAILY
Qty: 20 CAPSULE | Refills: 0 | Status: SHIPPED | OUTPATIENT
Start: 2023-05-17 | End: 2023-05-17 | Stop reason: SDUPTHER

## 2023-05-17 RX ADMIN — SODIUM CHLORIDE 1000 MG: 900 INJECTION INTRAVENOUS at 01:40

## 2023-05-17 RX ADMIN — KETOROLAC TROMETHAMINE 30 MG: 30 INJECTION, SOLUTION INTRAMUSCULAR; INTRAVENOUS at 01:40

## 2023-05-17 ASSESSMENT — ENCOUNTER SYMPTOMS
VOMITING: 0
NAUSEA: 0
SHORTNESS OF BREATH: 0
BACK PAIN: 1
ABDOMINAL PAIN: 0
DIARRHEA: 0

## 2023-05-17 ASSESSMENT — PAIN DESCRIPTION - LOCATION: LOCATION: BACK

## 2023-05-17 ASSESSMENT — LIFESTYLE VARIABLES
HOW OFTEN DO YOU HAVE A DRINK CONTAINING ALCOHOL: MONTHLY OR LESS
HOW MANY STANDARD DRINKS CONTAINING ALCOHOL DO YOU HAVE ON A TYPICAL DAY: 1 OR 2

## 2023-05-17 ASSESSMENT — PAIN DESCRIPTION - DESCRIPTORS: DESCRIPTORS: OTHER (COMMENT)

## 2023-05-17 ASSESSMENT — PAIN DESCRIPTION - ORIENTATION: ORIENTATION: LOWER

## 2023-05-17 ASSESSMENT — PAIN SCALES - GENERAL: PAINLEVEL_OUTOF10: 8

## 2023-05-17 NOTE — DISCHARGE INSTRUCTIONS
It was a pleasure taking care of you in our Emergency Department today. We know that when you come to [facility], you are entrusting us with your health, comfort, and safety. Our physicians and nurses honor that trust, and truly appreciate the opportunity to care for you and your loved ones. We also value your feedback. If you receive a survey about your Emergency Department experience today, please fill it out. We care about our patients' feedback, and we listen to what you have to say. Thank you!       Dr. Vinod Polanco MD.

## 2023-05-17 NOTE — ED PROVIDER NOTES
EMERGENCY DEPARTMENT HISTORY AND PHYSICAL EXAM     ----------------------------------------------------------------------------  Please note that this dictation was completed with [employer], the [employer] voice recognition software. Quite often unanticipated grammatical, syntax, homophones, and other interpretive errors are inadvertently transcribed by the computer software. Please disregard these errors. Please excuse any errors that have escaped final proofreading  ----------------------------------------------------------------------------      Date: 5/17/2023  Patient Name: Brynn Mijares    History of Presenting Illness     Chief Complaint   Patient presents with    Back Pain     Pt ambulatory to triage complaint of bilateral flank and lower back pain, pt states she recently finished antibiotic for UTI, still having some pain with urination       History obtainted from:  Patient    Other independent source of history: none    HPI: Brynn Mijares is a 30 y.o. female, with significant pmhx of depression and anxiety, who presents via private vehicle to the ED with c/o bilateral mid back pain that has been ongoing for the last 2 days. Notes that she is having difficulty finding any comfortable position. Denies associated fever, chills, vomiting or diarrhea. Notes that she recently completed a course of Keflex for a UTI approximately 1 week ago. PCP: No primary care provider on file.     No Known Allergies    Current Facility-Administered Medications   Medication Dose Route Frequency Provider Last Rate Last Admin    cefTRIAXone (ROCEPHIN) 1,000 mg in sodium chloride 0.9 % 50 mL IVPB (mini-bag)  1,000 mg IntraVENous NOW Kailey Lutz  mL/hr at 05/17/23 0140 1,000 mg at 05/17/23 0140     Current Outpatient Medications   Medication Sig Dispense Refill    cloNIDine (CATAPRES) 0.1 MG tablet Take by mouth      levocetirizine (XYZAL) 5 MG tablet Take by mouth daily      sertraline (ZOLOFT) 100 MG tablet Take by

## 2023-05-17 NOTE — ED NOTES
Discharge instructions given to patient by Asha Pearce Rn.  Verbalized understanding of instructions. Patient discharged without difficulty. Patient discharged in stable condition via ambulation accompanied by self.        Janki Aguilar RN  05/17/23 7698